# Patient Record
Sex: FEMALE | Race: WHITE | NOT HISPANIC OR LATINO | Employment: UNEMPLOYED | URBAN - METROPOLITAN AREA
[De-identification: names, ages, dates, MRNs, and addresses within clinical notes are randomized per-mention and may not be internally consistent; named-entity substitution may affect disease eponyms.]

---

## 2018-11-30 ENCOUNTER — HOSPITAL ENCOUNTER (INPATIENT)
Facility: HOSPITAL | Age: 53
LOS: 3 days | Discharge: HOME | DRG: 316 | End: 2018-12-03
Attending: STUDENT IN AN ORGANIZED HEALTH CARE EDUCATION/TRAINING PROGRAM | Admitting: FAMILY MEDICINE
Payer: MEDICARE

## 2018-11-30 ENCOUNTER — APPOINTMENT (EMERGENCY)
Dept: MRI IMAGING | Facility: HOSPITAL | Age: 53
End: 2018-11-30
Payer: MEDICARE

## 2018-11-30 ENCOUNTER — APPOINTMENT (EMERGENCY)
Dept: RADIOLOGY | Facility: HOSPITAL | Age: 53
End: 2018-11-30
Payer: MEDICARE

## 2018-11-30 ENCOUNTER — HOSPITAL ENCOUNTER (EMERGENCY)
Facility: HOSPITAL | Age: 53
End: 2018-11-30
Attending: EMERGENCY MEDICINE | Admitting: EMERGENCY MEDICINE
Payer: MEDICARE

## 2018-11-30 VITALS
HEART RATE: 72 BPM | OXYGEN SATURATION: 95 % | WEIGHT: 158.07 LBS | HEIGHT: 59 IN | TEMPERATURE: 99 F | RESPIRATION RATE: 18 BRPM | BODY MASS INDEX: 31.87 KG/M2 | SYSTOLIC BLOOD PRESSURE: 127 MMHG | DIASTOLIC BLOOD PRESSURE: 76 MMHG

## 2018-11-30 DIAGNOSIS — Z72.0 TOBACCO USE: ICD-10-CM

## 2018-11-30 DIAGNOSIS — M86.9 OSTEOMYELITIS OF FINGER OF RIGHT HAND (HCC): Primary | ICD-10-CM

## 2018-11-30 DIAGNOSIS — L02.519 HAND ABSCESS: ICD-10-CM

## 2018-11-30 DIAGNOSIS — L02.511 ABSCESS OF RIGHT INDEX FINGER: Primary | ICD-10-CM

## 2018-11-30 DIAGNOSIS — M65.9 TENOSYNOVITIS OF HAND: ICD-10-CM

## 2018-11-30 DIAGNOSIS — L02.511 ABSCESS OF RIGHT INDEX FINGER: ICD-10-CM

## 2018-11-30 DIAGNOSIS — M86.9 OSTEOMYELITIS (HCC): ICD-10-CM

## 2018-11-30 DIAGNOSIS — M86.9 FINGER OSTEOMYELITIS, RIGHT (HCC): ICD-10-CM

## 2018-11-30 LAB
ALBUMIN SERPL BCP-MCNC: 3.1 G/DL (ref 3.5–5)
ALP SERPL-CCNC: 95 U/L (ref 46–116)
ALT SERPL W P-5'-P-CCNC: 71 U/L (ref 12–78)
ANION GAP SERPL CALCULATED.3IONS-SCNC: 10 MMOL/L (ref 4–13)
APTT PPP: 28 SECONDS (ref 26–38)
AST SERPL W P-5'-P-CCNC: 27 U/L (ref 5–45)
BASOPHILS # BLD AUTO: 0.03 THOUSANDS/ΜL (ref 0–0.1)
BASOPHILS NFR BLD AUTO: 0 % (ref 0–1)
BILIRUB DIRECT SERPL-MCNC: 0.07 MG/DL (ref 0–0.2)
BILIRUB SERPL-MCNC: 0.2 MG/DL (ref 0.2–1)
BUN SERPL-MCNC: 16 MG/DL (ref 5–25)
CALCIUM SERPL-MCNC: 8.5 MG/DL (ref 8.3–10.1)
CHLORIDE SERPL-SCNC: 103 MMOL/L (ref 100–108)
CO2 SERPL-SCNC: 26 MMOL/L (ref 21–32)
CREAT SERPL-MCNC: 0.77 MG/DL (ref 0.6–1.3)
EOSINOPHIL # BLD AUTO: 0.25 THOUSAND/ΜL (ref 0–0.61)
EOSINOPHIL NFR BLD AUTO: 3 % (ref 0–6)
ERYTHROCYTE [DISTWIDTH] IN BLOOD BY AUTOMATED COUNT: 12.8 % (ref 11.6–15.1)
GFR SERPL CREATININE-BSD FRML MDRD: 89 ML/MIN/1.73SQ M
GLUCOSE SERPL-MCNC: 141 MG/DL (ref 65–140)
HCT VFR BLD AUTO: 38.6 % (ref 34.8–46.1)
HGB BLD-MCNC: 12.4 G/DL (ref 11.5–15.4)
IMM GRANULOCYTES # BLD AUTO: 0.12 THOUSAND/UL (ref 0–0.2)
IMM GRANULOCYTES NFR BLD AUTO: 1 % (ref 0–2)
INR PPP: 1.01 (ref 0.86–1.17)
LACTATE SERPL-SCNC: 0.9 MMOL/L (ref 0.5–2)
LYMPHOCYTES # BLD AUTO: 1.98 THOUSANDS/ΜL (ref 0.6–4.47)
LYMPHOCYTES NFR BLD AUTO: 22 % (ref 14–44)
MCH RBC QN AUTO: 30 PG (ref 26.8–34.3)
MCHC RBC AUTO-ENTMCNC: 32.1 G/DL (ref 31.4–37.4)
MCV RBC AUTO: 94 FL (ref 82–98)
MONOCYTES # BLD AUTO: 0.5 THOUSAND/ΜL (ref 0.17–1.22)
MONOCYTES NFR BLD AUTO: 6 % (ref 4–12)
NEUTROPHILS # BLD AUTO: 6.23 THOUSANDS/ΜL (ref 1.85–7.62)
NEUTS SEG NFR BLD AUTO: 68 % (ref 43–75)
NRBC BLD AUTO-RTO: 0 /100 WBCS
PLATELET # BLD AUTO: 341 THOUSANDS/UL (ref 149–390)
PMV BLD AUTO: 9.4 FL (ref 8.9–12.7)
POTASSIUM SERPL-SCNC: 3.7 MMOL/L (ref 3.5–5.3)
PROT SERPL-MCNC: 7.6 G/DL (ref 6.4–8.2)
PROTHROMBIN TIME: 13 SECONDS (ref 11.8–14.2)
RBC # BLD AUTO: 4.13 MILLION/UL (ref 3.81–5.12)
SODIUM SERPL-SCNC: 139 MMOL/L (ref 136–145)
WBC # BLD AUTO: 9.11 THOUSAND/UL (ref 4.31–10.16)

## 2018-11-30 PROCEDURE — 85730 THROMBOPLASTIN TIME PARTIAL: CPT | Performed by: EMERGENCY MEDICINE

## 2018-11-30 PROCEDURE — 87081 CULTURE SCREEN ONLY: CPT | Performed by: FAMILY MEDICINE

## 2018-11-30 PROCEDURE — 85025 COMPLETE CBC W/AUTO DIFF WBC: CPT | Performed by: EMERGENCY MEDICINE

## 2018-11-30 PROCEDURE — 85610 PROTHROMBIN TIME: CPT | Performed by: EMERGENCY MEDICINE

## 2018-11-30 PROCEDURE — 80076 HEPATIC FUNCTION PANEL: CPT | Performed by: EMERGENCY MEDICINE

## 2018-11-30 PROCEDURE — 83605 ASSAY OF LACTIC ACID: CPT | Performed by: EMERGENCY MEDICINE

## 2018-11-30 PROCEDURE — 73140 X-RAY EXAM OF FINGER(S): CPT

## 2018-11-30 PROCEDURE — 96366 THER/PROPH/DIAG IV INF ADDON: CPT

## 2018-11-30 PROCEDURE — 73220 MRI UPPR EXTREMITY W/O&W/DYE: CPT

## 2018-11-30 PROCEDURE — 96367 TX/PROPH/DG ADDL SEQ IV INF: CPT

## 2018-11-30 PROCEDURE — 87040 BLOOD CULTURE FOR BACTERIA: CPT | Performed by: EMERGENCY MEDICINE

## 2018-11-30 PROCEDURE — 36415 COLL VENOUS BLD VENIPUNCTURE: CPT | Performed by: EMERGENCY MEDICINE

## 2018-11-30 PROCEDURE — 99285 EMERGENCY DEPT VISIT HI MDM: CPT

## 2018-11-30 PROCEDURE — 80048 BASIC METABOLIC PNL TOTAL CA: CPT | Performed by: EMERGENCY MEDICINE

## 2018-11-30 PROCEDURE — 96365 THER/PROPH/DIAG IV INF INIT: CPT

## 2018-11-30 PROCEDURE — 99223 1ST HOSP IP/OBS HIGH 75: CPT | Performed by: FAMILY MEDICINE

## 2018-11-30 PROCEDURE — A9585 GADOBUTROL INJECTION: HCPCS | Performed by: EMERGENCY MEDICINE

## 2018-11-30 RX ORDER — VANCOMYCIN HYDROCHLORIDE 1 G/200ML
15 INJECTION, SOLUTION INTRAVENOUS ONCE
Status: COMPLETED | OUTPATIENT
Start: 2018-11-30 | End: 2018-11-30

## 2018-11-30 RX ORDER — VENLAFAXINE 75 MG/1
75 TABLET ORAL DAILY
COMMUNITY
End: 2018-12-14 | Stop reason: DRUGHIGH

## 2018-11-30 RX ORDER — OXYCODONE HYDROCHLORIDE 5 MG/1
5 TABLET ORAL EVERY 6 HOURS PRN
Status: DISCONTINUED | OUTPATIENT
Start: 2018-11-30 | End: 2018-12-03 | Stop reason: HOSPADM

## 2018-11-30 RX ORDER — BUSPIRONE HYDROCHLORIDE 10 MG/1
10 TABLET ORAL 3 TIMES DAILY PRN
COMMUNITY
End: 2020-04-01

## 2018-11-30 RX ORDER — NICOTINE 21 MG/24HR
1 PATCH, TRANSDERMAL 24 HOURS TRANSDERMAL DAILY
Status: DISCONTINUED | OUTPATIENT
Start: 2018-12-01 | End: 2018-12-03 | Stop reason: HOSPADM

## 2018-11-30 RX ORDER — LISINOPRIL 20 MG/1
20 TABLET ORAL DAILY
COMMUNITY
End: 2020-07-01 | Stop reason: SDUPTHER

## 2018-11-30 RX ORDER — ONDANSETRON 2 MG/ML
4 INJECTION INTRAMUSCULAR; INTRAVENOUS EVERY 6 HOURS PRN
Status: DISCONTINUED | OUTPATIENT
Start: 2018-11-30 | End: 2018-12-03 | Stop reason: HOSPADM

## 2018-11-30 RX ORDER — ATORVASTATIN CALCIUM 40 MG/1
40 TABLET, FILM COATED ORAL DAILY
COMMUNITY
End: 2020-06-24

## 2018-11-30 RX ORDER — VENLAFAXINE 100 MG/1
100 TABLET ORAL DAILY
COMMUNITY
End: 2018-12-14 | Stop reason: DRUGHIGH

## 2018-11-30 RX ORDER — AMLODIPINE BESYLATE 5 MG/1
5 TABLET ORAL DAILY
Status: DISCONTINUED | OUTPATIENT
Start: 2018-12-01 | End: 2018-12-03 | Stop reason: HOSPADM

## 2018-11-30 RX ORDER — LISINOPRIL 20 MG/1
20 TABLET ORAL DAILY
Status: DISCONTINUED | OUTPATIENT
Start: 2018-12-01 | End: 2018-12-03 | Stop reason: HOSPADM

## 2018-11-30 RX ORDER — OXYCODONE HYDROCHLORIDE 5 MG/1
5 TABLET ORAL EVERY 6 HOURS PRN
COMMUNITY
End: 2018-12-03

## 2018-11-30 RX ORDER — VENLAFAXINE 37.5 MG/1
75 TABLET ORAL DAILY
Status: DISCONTINUED | OUTPATIENT
Start: 2018-12-01 | End: 2018-12-03 | Stop reason: HOSPADM

## 2018-11-30 RX ORDER — AMLODIPINE BESYLATE 5 MG/1
5 TABLET ORAL DAILY
COMMUNITY
End: 2020-06-24

## 2018-11-30 RX ORDER — ATORVASTATIN CALCIUM 40 MG/1
40 TABLET, FILM COATED ORAL DAILY
Status: DISCONTINUED | OUTPATIENT
Start: 2018-12-01 | End: 2018-12-03 | Stop reason: HOSPADM

## 2018-11-30 RX ADMIN — VANCOMYCIN HYDROCHLORIDE 250 MG: 500 INJECTION, POWDER, LYOPHILIZED, FOR SOLUTION INTRAVENOUS at 17:54

## 2018-11-30 RX ADMIN — OXYCODONE HYDROCHLORIDE 5 MG: 5 TABLET ORAL at 23:00

## 2018-11-30 RX ADMIN — PIPERACILLIN SODIUM,TAZOBACTAM SODIUM 3.38 G: 3; .375 INJECTION, POWDER, FOR SOLUTION INTRAVENOUS at 14:09

## 2018-11-30 RX ADMIN — VANCOMYCIN HYDROCHLORIDE 1000 MG: 750 INJECTION, SOLUTION INTRAVENOUS at 15:03

## 2018-11-30 RX ADMIN — GADOBUTROL 7 ML: 604.72 INJECTION INTRAVENOUS at 14:01

## 2018-11-30 NOTE — ED ATTENDING ATTESTATION
I, Odalis Guerra DO, saw and evaluated the patient  I have discussed the patient with the resident/non-physician practitioner and agree with the resident's/non-physician practitioner's findings, Plan of Care, and MDM as documented in the resident's/non-physician practitioner's note, except where noted  All available labs and Radiology studies were reviewed  At this point I agree with the current assessment done in the Emergency Department  I have conducted an independent evaluation of this patient a history and physical is as follows:      Critical Care Time  The patient presented with a condition in which there was a high probability of imminent or life-threatening deterioration, and critical care services (excluding separately billable procedures) totalled 30-74 minutes  Procedures       15-year-old female, cut her finger with a can to half weeks ago, developed an infection was admitted to [facility] for 5 days IV antibiotics discharged home Keflex erythema the hand and arm has significantly decreased but over the past 3 days patient's finger has tripled in size become painful , describes pain as dull constant nonradiating worse with palpation better alone 5/10  Still taking Keflex  No fevers chills nausea vomiting diarrhea dysuria chest pain shortness of breath,  All other review of systems negative  On examination, patient appears uncomfortable, heart regular rate rhythm lungs clear, abdomen nontender, right hand, 2nd digit, diffusely swollen, dusky in appearance, desquamation of tissue of 50%  , dry gangrenous appearance to flexor aspect over DI P S  Patient's finger in a mallet finger deformity  This is new  After multiple discussions between Hand surgery patient was kept here at [facility] to obtain an MRI of the finger, the which shows abscess and early osteomyelitis  , as per Hand surgery requesting patient be transferred to [place] [address] so he can operate on her today    Case was discussed with internal medicine over there for admission

## 2018-11-30 NOTE — ED NOTES
Pt  Returned from [facility], RN  11/30/18 6884 Jarret Hagen, [facility]  11/30/18 9849

## 2018-11-30 NOTE — ED PROVIDER NOTES
History  Chief Complaint   Patient presents with    Finger Injury     patient cut right hand second finger on food can two weeks ago  Received IV antibiotics and was sent home with Keflex  no improvement noted  Patient is a 50-year-old female that presents for worsening right index finger infection  Patient states that she cut her finger on a can 2 weeks ago and was seen at [facility]   Patient was admitted and received 5 days of IV antibiotics  During her admission multiple blisters were incised but no surgical incision was created  Patient was discharged with p o  Keflex which she has been taking, along with a follow-up with Infectious Disease on December 12  Patient states that the redness has decreased from mid forearm down to just her finger  [place] [facility] However her index finger has tripled in size and now her finger tip he has been turned down  Patient has moderate tenderness to palpation along the finger  Patient denies fever, chills, shortness of breath, pain in palm, wrist or forearm  Prior to Admission Medications   Prescriptions Last Dose Informant Patient Reported? Taking? amLODIPine (NORVASC) 5 mg tablet  Self Yes Yes   Sig: Take 5 mg by mouth daily   aspirin (ECOTRIN) 325 mg EC tablet  Self Yes Yes   Sig: Take 325 mg by mouth daily   atorvastatin (LIPITOR) 40 mg tablet  Self Yes Yes   Sig: Take 40 mg by mouth daily   busPIRone (BUSPAR) 10 mg tablet  Self Yes Yes   Sig: Take 10 mg by mouth 3 (three) times a day   lisinopril (ZESTRIL) 20 mg tablet  Self Yes Yes   Sig: Take 20 mg by mouth daily   venlafaxine (EFFEXOR) 75 mg tablet  Self Yes Yes   Sig: Take 75 mg by mouth 2 (two) times a day      Facility-Administered Medications: None       Past Medical History:   Diagnosis Date    Hypertension     Stroke [facility])        Past Surgical History:   Procedure Laterality Date    CHOLECYSTECTOMY         History reviewed  No pertinent family history    I have reviewed and agree with the history as documented  Social History   Substance Use Topics    Smoking status: Current Some Day Smoker    Smokeless tobacco: Never Used    Alcohol use Yes      Comment: occasional        Review of Systems   Constitutional: Negative for activity change, chills, diaphoresis, fatigue and fever  HENT: Negative for congestion, sinus pressure and sore throat  Eyes: Negative for pain and visual disturbance  Respiratory: Negative for cough, chest tightness, shortness of breath, wheezing and stridor  Cardiovascular: Negative for chest pain, palpitations and leg swelling  Gastrointestinal: Negative for abdominal distention, abdominal pain, constipation, diarrhea, nausea and vomiting  Genitourinary: Negative for dysuria and frequency  Musculoskeletal: Negative for neck pain and neck stiffness  Right finger swelling and pain   Skin: Positive for color change and pallor  Negative for rash  Allergic/Immunologic: Negative for immunocompromised state  Neurological: Negative for dizziness, speech difficulty, weakness, light-headedness, numbness and headaches  Psychiatric/Behavioral: Negative for agitation, behavioral problems and confusion  Physical Exam  Physical Exam   Constitutional: She is oriented to person, place, and time  She appears well-developed and well-nourished  HENT:   Head: Normocephalic and atraumatic  Nose: Nose normal    Eyes: Pupils are equal, round, and reactive to light  EOM are normal    Neck: Normal range of motion  Neck supple  Cardiovascular: Normal rate, regular rhythm and normal heart sounds  Pulmonary/Chest: Effort normal and breath sounds normal    Abdominal: Soft  Bowel sounds are normal    Musculoskeletal: She exhibits edema, tenderness and deformity  Right index finger edematous, erythematous and gray with a dusky appearance  Two sections of necrotic tissue on san side of finger  No redness or streaking along tendon to palm or forearm  Neurological: She is alert and oriented to person, place, and time  Skin: Skin is warm and dry  Psychiatric: She has a normal mood and affect  Judgment and thought content normal        Vital Signs  ED Triage Vitals [11/30/18 1112]   Temperature Pulse Respirations Blood Pressure SpO2   98 1 °F (36 7 °C) 63 18 151/86 100 %      Temp Source Heart Rate Source Patient Position - Orthostatic VS BP Location FiO2 (%)   Oral Monitor Lying Left arm --      Pain Score       3           Vitals:    11/30/18 1436 11/30/18 1549 11/30/18 1820 11/30/18 1830   BP: 114/70 (!) 178/85 138/79 138/79   Pulse: 66 60 69    Patient Position - Orthostatic VS: Lying Lying Lying        Visual Acuity      ED Medications  Medications   piperacillin-tazobactam (ZOSYN) 3 375 g in sodium chloride 0 9 % 50 mL IVPB (0 g Intravenous Stopped 11/30/18 1439)   vancomycin (VANCOCIN) IVPB (premix) 1,000 mg (0 mg/kg × 73 kg Intravenous Stopped 11/30/18 1712)   gadobutrol injection (MULTI-DOSE) SOLN 7 mL (7 mL Intravenous Given 11/30/18 1401)   vancomycin (VANCOCIN) 250 mg in sodium chloride 0 9 % 100 mL IVPB (0 mg Intravenous Stopped 11/30/18 1824)       Diagnostic Studies  Results Reviewed     Procedure Component Value Units Date/Time    POCT pregnancy, urine [[id number]]     Lab Status:  No result     Lactic acid, plasma [[id number]]  (Normal) Collected:  11/30/18 1204    Lab Status:  Final result Specimen:  Blood from Arm, Right Updated:  11/30/18 1233     LACTIC ACID 0 9 mmol/L     Narrative:         Result may be elevated if tourniquet was used during collection      Basic metabolic panel [[id number]]  (Abnormal) Collected:  11/30/18 1204    Lab Status:  Final result Specimen:  Blood from Arm, Right Updated:  11/30/18 1230     Sodium 139 mmol/L      Potassium 3 7 mmol/L      Chloride 103 mmol/L      CO2 26 mmol/L      ANION GAP 10 mmol/L      BUN 16 mg/dL      Creatinine 0 77 mg/dL      Glucose 141 (H) mg/dL      Calcium 8 5 mg/dL      eGFR 89 ml/min/1 73sq m     Narrative:         National Kidney Disease Education Program recommendations are as follows:  GFR calculation is accurate only with a steady state creatinine  Chronic Kidney disease less than 60 ml/min/1 73 sq  meters  Kidney failure less than 15 ml/min/1 73 sq  meters  Hepatic function panel [[id number]]  (Abnormal) Collected:  11/30/18 1204    Lab Status:  Final result Specimen:  Blood from Arm, Right Updated:  11/30/18 1230     Total Bilirubin 0 20 mg/dL      Bilirubin, Direct 0 07 mg/dL      Alkaline Phosphatase 95 U/L      AST 27 U/L      ALT 71 U/L      Total Protein 7 6 g/dL      Albumin 3 1 (L) g/dL     Protime-INR [[id number]]  (Normal) Collected:  11/30/18 1204    Lab Status:  Final result Specimen:  Blood from Arm, Right Updated:  11/30/18 1225     Protime 13 0 seconds      INR 1 01    APTT [[id number]]  (Normal) Collected:  11/30/18 1204    Lab Status:  Final result Specimen:  Blood from Arm, Right Updated:  11/30/18 1225     PTT 28 seconds     Blood culture #1 [[id number]] Collected:  11/30/18 1217    Lab Status:   In process Specimen:  Blood from Arm, Left Updated:  11/30/18 1223    CBC and differential [[id number]] Collected:  11/30/18 1204    Lab Status:  Final result Specimen:  Blood from Arm, Right Updated:  11/30/18 1213     WBC 9 11 Thousand/uL      RBC 4 13 Million/uL      Hemoglobin 12 4 g/dL      Hematocrit 38 6 %      MCV 94 fL      MCH 30 0 pg      MCHC 32 1 g/dL      RDW 12 8 %      MPV 9 4 fL      Platelets 182 Thousands/uL      nRBC 0 /100 WBCs      Neutrophils Relative 68 %      Immat GRANS % 1 %      Lymphocytes Relative 22 %      Monocytes Relative 6 %      Eosinophils Relative 3 %      Basophils Relative 0 %      Neutrophils Absolute 6 23 Thousands/µL      Immature Grans Absolute 0 12 Thousand/uL      Lymphocytes Absolute 1 98 Thousands/µL      Monocytes Absolute 0 50 Thousand/µL      Eosinophils Absolute 0 25 Thousand/µL      Basophils Absolute 0 03 Thousands/µL Blood culture #2 [[id number]] Collected:  11/30/18 1204    Lab Status: In process Specimen:  Blood from Arm, Right Updated:  11/30/18 1212                 MRI hand right w wo contrast   Final Result by Dariel Heimlich, MD (11/30 4203)      1  Findings consistent with  abscess  palmar to the 2nd proximal interphalangeal joint, measuring up to 7 mm  Additionally, there are foci of hypoperfusion extending from the injury site to the middle aspect of the proximal phalanx which are consistent    with developing abscesses  Please note, necrotizing fasciitis is difficult to diagnose the imaging findings alone, and must be weighed with the clinical findings  2   Bone marrow edema within the 2nd middle phalanx concerning for early osteomyelitis   3  Tenosynovitis of the 2nd digit flexors, likely infectious  The study was marked in [facility] for immediate notification  Workstation performed: [id number]         XR finger second digit-index RIGHT   Final Result by Alcon Ulloa DO (11/30 9078)   1  Diffuse soft tissue swelling surrounding the entire right 2nd digit  Lucency in the soft tissues at the distal interphalangeal joint, suspicious for soft tissue ulceration  Lucent foci in the ventral soft tissues overlying the middle phalanx,    suspicious for subcutaneous emphysema  Findings consistent with diffuse cellulitis  Correlate for necrotizing fasciitis  2   Mild flexion at the distal interphalangeal joint  Correlate for flexor tendon injury  3   No evidence of acute fracture or radiopaque foreign body               I personally discussed this study with Giana Magaña on 11/30/2018 at 12:49 PM                Workstation performed: [id number]                    Procedures  Procedures       Phone Contacts  ED Phone Contact    ED Course                               MDM  Number of Diagnoses or Management Options  Abscess of right index finger: new and requires workup  Finger osteomyelitis, right ([facility] [address] ): new and requires workup  Hand abscess: new and requires workup  Osteomyelitis [facility]): new and requires workup  Tenosynovitis of hand: new and requires workup     Amount and/or Complexity of Data Reviewed  Clinical lab tests: ordered and reviewed  Tests in the radiology section of CPT®: ordered and reviewed  Tests in the medicine section of CPT®: ordered and reviewed  Discussion of test results with the performing providers: yes  Decide to obtain previous medical records or to obtain history from someone other than the patient: yes  Obtain history from someone other than the patient: yes  Review and summarize past medical records: yes  Discuss the patient with other providers: yes  Independent visualization of images, tracings, or specimens: yes    Risk of Complications, Morbidity, and/or Mortality  Presenting problems: high  Diagnostic procedures: high  Management options: high    Patient Progress  Patient progress: stable    CritCare Time    Disposition  Final diagnoses:   Osteomyelitis ([facility] [address] )   Hand abscess   Tenosynovitis of hand   Abscess of right index finger   Finger osteomyelitis, right ([facility] )     Time reflects when diagnosis was documented in both MDM as applicable and the Disposition within this note     Time User Action Codes Description Comment    11/30/2018  3:33 PM Clemente [place] Add [M86 9] Osteomyelitis ([address] )     11/30/2018  3:33 PM Clemente Rubén Add [L02 519] Hand abscess     11/30/2018  3:33 PM Britney SCHILLING Add [M65 9] Tenosynovitis of hand     11/30/2018  8:17 PM Belkis [place] Add [L02 511] Abscess of right index finger     11/30/2018  8:18 PM Thyra Olvin [M86 9] Osteomyelitis ([facility] [address] )     11/30/2018  8:18 PM Dieudonne Ny Modify [L02 511] Abscess of right index finger     11/30/2018  8:18 PM Dieudonne Ny Add [M86 9] Finger osteomyelitis, right ([facility] [address] )     11/30/2018  8:18 PM Dieudonne Ny Modify [L02 511] Abscess of right index finger     11/30/2018  8:18 PM Shoshana De Leon Rudolph [place] Modify [M86 9] Finger osteomyelitis, right ([address] )     11/30/2018  8:18 PM Sharyle Beverage Modify [L02 511] Abscess of right index finger     11/30/2018  8:18 PM Sharyle Beverage Modify [M86 9] Finger osteomyelitis, right [facility])       ED Disposition     ED Disposition Condition Comment    Transfer to Another [address] [place] [address] should be transferred out to [address] [place] [address], General Medicine Service under Dr Ed Allen MD Documentation      Most Recent Value   Patient Condition  The patient has been stabilized such that within reasonable medical probability, no material deterioration of the patient condition or the condition of the unborn child(chidi) is likely to result from the transfer   Reason for Transfer  Level of Care needed not available at this facility   Benefits of Transfer  Specialized equipment and/or services available at the receiving facility (Include comment)________________________   Risks of Transfer  Potential deterioration of medical condition   Accepting Physician  Dr Cindy Herrera Name, [facility]   Sending MD Dr Darrin Cottrell   Provider Certification  General risk, such as traffic hazards, adverse weather conditions, rough terrain or turbulence, possible failure of equipment (including vehicle or aircraft), or consequences of actions of persons outside the control of the transport personnel, Risk of worsening condition      RN Documentation      Most Recent Value   Accepting Facility Name, Donnell Cannon      Follow-up Information    None         Patient's Medications   Discharge Prescriptions    No medications on file     No discharge procedures on file      ED Provider  Electronically Signed by           Jaquelin Wilson PA-C  11/30/18 0575 [facility],   11/30/18 2018

## 2018-11-30 NOTE — EMTALA/ACUTE CARE TRANSFER
[id number]  [id number]  Dept: [phone]      EMTALA TRANSFER CONSENT    NAME Ash Mak                                         1965                              MRN [id number]    I have been informed of my rights regarding examination, treatment, and transfer   by Dr Michael Olivier DO    Benefits: Specialized equipment and/or services available at the receiving facility (Include comment)________________________ (Hand specialist)    Risks: Potential deterioration of medical condition      Consent for Transfer:  I acknowledge that my medical condition has been evaluated and explained to me by the emergency department physician or other qualified medical person and/or my attending physician, who has recommended that I be transferred to the service of  Accepting Physician: Dr Aiyana Harris at [address] [facility] Name, [address] : [place]  The above potential benefits of such transfer, the potential risks associated with such transfer, and the probable risks of not being transferred have been explained to me, and I fully understand them  The doctor has explained that, in my case, the benefits of transfer outweigh the risks  I agree to be transferred  I authorize the performance of emergency medical procedures and treatments upon me in both transit and upon arrival at the receiving facility  Additionally, I authorize the release of any and all medical records to the receiving facility and request they be transported with me, if possible  I understand that the safest mode of transportation during a medical emergency is an ambulance and that the Hospital advocates the use of this mode of transport  Risks of traveling to the receiving facility by car, including absence of medical control, life sustaining equipment, such as oxygen, and medical personnel has been explained to me and I fully understand them      ([address])  [  ]  I consent to the stated transfer and to be transported by ambulance/helicopter  [  ]  I consent to the stated transfer, but refuse transportation by ambulance and accept full responsibility for my transportation by car  I understand the risks of non-ambulance transfers and I exonerate the Hospital and its staff from any deterioration in my condition that results from this refusal     X___________________________________________    DATE  18  TIME________  Signature of patient or legally responsible individual signing on patient behalf           RELATIONSHIP TO PATIENT_________________________          Provider Certification    NAME Chad Travis                                        [place] 1965                              MRN [id number]    A medical screening exam was performed on the above named patient  Based on the examination:    Condition Necessitating Transfer The primary encounter diagnosis was Osteomyelitis ([address] )  Diagnoses of Hand abscess and Tenosynovitis of hand were also pertinent to this visit      Patient Condition: The patient has been stabilized such that within reasonable medical probability, no material deterioration of the patient condition or the condition of the unborn child(chidi) is likely to result from the transfer    Reason for Transfer: Level of Care needed not available at this facility    Transfer Requirements: [address]   · Space available and qualified personnel available for treatment as acknowledged by    · Agreed to accept transfer and to provide appropriate medical treatment as acknowledged by       Dr Akil Lopez  · Appropriate medical records of the examination and treatment of the patient are provided at the time of transfer   [address], [address] _______  · Transfer will be performed by qualified personnel from    and appropriate transfer equipment as required, including the use of necessary and appropriate life support measures  Provider Certification: I have examined the patient and explained the following risks and benefits of being transferred/refusing transfer to the patient/family:  General risk, such as traffic hazards, adverse weather conditions, rough terrain or turbulence, possible failure of equipment (including vehicle or aircraft), or consequences of actions of persons outside the control of the transport personnel, Risk of worsening condition      Based on these reasonable risks and benefits to the patient and/or the unborn child(chidi), and based upon the information available at the time of the patients examination, I certify that the medical benefits reasonably to be expected from the provision of appropriate medical treatments at another medical facility outweigh the increasing risks, if any, to the individuals medical condition, and in the case of labor to the unborn child, from effecting the transfer      X____________________________________________ DATE 11/30/18        TIME_______      ORIGINAL - SEND TO MEDICAL RECORDS   COPY - SEND WITH PATIENT DURING TRANSFER

## 2018-12-01 ENCOUNTER — ANESTHESIA EVENT (INPATIENT)
Dept: PERIOP | Facility: HOSPITAL | Age: 53
DRG: 316 | End: 2018-12-01
Payer: MEDICARE

## 2018-12-01 ENCOUNTER — APPOINTMENT (INPATIENT)
Dept: RADIOLOGY | Facility: HOSPITAL | Age: 53
DRG: 316 | End: 2018-12-01
Payer: MEDICARE

## 2018-12-01 ENCOUNTER — ANESTHESIA (INPATIENT)
Dept: PERIOP | Facility: HOSPITAL | Age: 53
DRG: 316 | End: 2018-12-01
Payer: MEDICARE

## 2018-12-01 PROBLEM — Z72.0 TOBACCO USE: Status: ACTIVE | Noted: 2018-12-01

## 2018-12-01 PROBLEM — F41.9 ANXIETY AND DEPRESSION: Status: ACTIVE | Noted: 2018-12-01

## 2018-12-01 PROBLEM — F32.A ANXIETY AND DEPRESSION: Status: ACTIVE | Noted: 2018-12-01

## 2018-12-01 PROCEDURE — 26910 AMPUTATE METACARPAL BONE: CPT | Performed by: ORTHOPAEDIC SURGERY

## 2018-12-01 PROCEDURE — 88300 SURGICAL PATH GROSS: CPT | Performed by: PATHOLOGY

## 2018-12-01 PROCEDURE — 87147 CULTURE TYPE IMMUNOLOGIC: CPT | Performed by: ORTHOPAEDIC SURGERY

## 2018-12-01 PROCEDURE — 73140 X-RAY EXAM OF FINGER(S): CPT

## 2018-12-01 PROCEDURE — 0X6N0Z0 DETACHMENT AT RIGHT INDEX FINGER, COMPLETE, OPEN APPROACH: ICD-10-PCS | Performed by: ORTHOPAEDIC SURGERY

## 2018-12-01 PROCEDURE — 87205 SMEAR GRAM STAIN: CPT | Performed by: ORTHOPAEDIC SURGERY

## 2018-12-01 PROCEDURE — 93005 ELECTROCARDIOGRAM TRACING: CPT

## 2018-12-01 PROCEDURE — 87070 CULTURE OTHR SPECIMN AEROBIC: CPT | Performed by: ORTHOPAEDIC SURGERY

## 2018-12-01 PROCEDURE — 99232 SBSQ HOSP IP/OBS MODERATE 35: CPT | Performed by: STUDENT IN AN ORGANIZED HEALTH CARE EDUCATION/TRAINING PROGRAM

## 2018-12-01 PROCEDURE — 99252 IP/OBS CONSLTJ NEW/EST SF 35: CPT | Performed by: ORTHOPAEDIC SURGERY

## 2018-12-01 PROCEDURE — 87075 CULTR BACTERIA EXCEPT BLOOD: CPT | Performed by: ORTHOPAEDIC SURGERY

## 2018-12-01 RX ORDER — PROPOFOL 10 MG/ML
INJECTION, EMULSION INTRAVENOUS CONTINUOUS PRN
Status: DISCONTINUED | OUTPATIENT
Start: 2018-12-01 | End: 2018-12-01 | Stop reason: SURG

## 2018-12-01 RX ORDER — BUSPIRONE HYDROCHLORIDE 10 MG/1
10 TABLET ORAL 3 TIMES DAILY PRN
Status: DISCONTINUED | OUTPATIENT
Start: 2018-12-01 | End: 2018-12-03 | Stop reason: HOSPADM

## 2018-12-01 RX ORDER — MIDAZOLAM HYDROCHLORIDE 1 MG/ML
INJECTION INTRAMUSCULAR; INTRAVENOUS AS NEEDED
Status: DISCONTINUED | OUTPATIENT
Start: 2018-12-01 | End: 2018-12-01 | Stop reason: SURG

## 2018-12-01 RX ORDER — SODIUM CHLORIDE, SODIUM LACTATE, POTASSIUM CHLORIDE, CALCIUM CHLORIDE 600; 310; 30; 20 MG/100ML; MG/100ML; MG/100ML; MG/100ML
100 INJECTION, SOLUTION INTRAVENOUS CONTINUOUS
Status: DISCONTINUED | OUTPATIENT
Start: 2018-12-01 | End: 2018-12-01

## 2018-12-01 RX ORDER — HYDROMORPHONE HCL/PF 1 MG/ML
0.5 SYRINGE (ML) INJECTION
Status: DISCONTINUED | OUTPATIENT
Start: 2018-12-01 | End: 2018-12-01 | Stop reason: HOSPADM

## 2018-12-01 RX ORDER — SODIUM CHLORIDE, SODIUM LACTATE, POTASSIUM CHLORIDE, CALCIUM CHLORIDE 600; 310; 30; 20 MG/100ML; MG/100ML; MG/100ML; MG/100ML
75 INJECTION, SOLUTION INTRAVENOUS CONTINUOUS
Status: DISCONTINUED | OUTPATIENT
Start: 2018-12-01 | End: 2018-12-01

## 2018-12-01 RX ORDER — HYDRALAZINE HYDROCHLORIDE 20 MG/ML
10 INJECTION INTRAMUSCULAR; INTRAVENOUS ONCE
Status: COMPLETED | OUTPATIENT
Start: 2018-12-01 | End: 2018-12-01

## 2018-12-01 RX ORDER — DEXAMETHASONE SODIUM PHOSPHATE 4 MG/ML
INJECTION, SOLUTION INTRA-ARTICULAR; INTRALESIONAL; INTRAMUSCULAR; INTRAVENOUS; SOFT TISSUE AS NEEDED
Status: DISCONTINUED | OUTPATIENT
Start: 2018-12-01 | End: 2018-12-01 | Stop reason: SURG

## 2018-12-01 RX ORDER — FENTANYL CITRATE 50 UG/ML
INJECTION, SOLUTION INTRAMUSCULAR; INTRAVENOUS AS NEEDED
Status: DISCONTINUED | OUTPATIENT
Start: 2018-12-01 | End: 2018-12-01 | Stop reason: SURG

## 2018-12-01 RX ORDER — ONDANSETRON 2 MG/ML
4 INJECTION INTRAMUSCULAR; INTRAVENOUS ONCE AS NEEDED
Status: DISCONTINUED | OUTPATIENT
Start: 2018-12-01 | End: 2018-12-01 | Stop reason: HOSPADM

## 2018-12-01 RX ORDER — HYDROMORPHONE HYDROCHLORIDE 2 MG/ML
INJECTION, SOLUTION INTRAMUSCULAR; INTRAVENOUS; SUBCUTANEOUS AS NEEDED
Status: DISCONTINUED | OUTPATIENT
Start: 2018-12-01 | End: 2018-12-01 | Stop reason: SURG

## 2018-12-01 RX ORDER — CEFAZOLIN SODIUM 1 G/50ML
1000 SOLUTION INTRAVENOUS ONCE
Status: DISCONTINUED | OUTPATIENT
Start: 2018-12-01 | End: 2018-12-01

## 2018-12-01 RX ORDER — CEFAZOLIN SODIUM 1 G/3ML
INJECTION, POWDER, FOR SOLUTION INTRAMUSCULAR; INTRAVENOUS AS NEEDED
Status: DISCONTINUED | OUTPATIENT
Start: 2018-12-01 | End: 2018-12-01 | Stop reason: SURG

## 2018-12-01 RX ORDER — SODIUM CHLORIDE, SODIUM LACTATE, POTASSIUM CHLORIDE, CALCIUM CHLORIDE 600; 310; 30; 20 MG/100ML; MG/100ML; MG/100ML; MG/100ML
INJECTION, SOLUTION INTRAVENOUS CONTINUOUS PRN
Status: DISCONTINUED | OUTPATIENT
Start: 2018-12-01 | End: 2018-12-01

## 2018-12-01 RX ORDER — ONDANSETRON 2 MG/ML
INJECTION INTRAMUSCULAR; INTRAVENOUS AS NEEDED
Status: DISCONTINUED | OUTPATIENT
Start: 2018-12-01 | End: 2018-12-01 | Stop reason: SURG

## 2018-12-01 RX ORDER — VANCOMYCIN HYDROCHLORIDE 1 G/200ML
15 INJECTION, SOLUTION INTRAVENOUS EVERY 12 HOURS
Status: DISCONTINUED | OUTPATIENT
Start: 2018-12-01 | End: 2018-12-03

## 2018-12-01 RX ORDER — KETAMINE HYDROCHLORIDE 50 MG/ML
INJECTION, SOLUTION, CONCENTRATE INTRAMUSCULAR; INTRAVENOUS AS NEEDED
Status: DISCONTINUED | OUTPATIENT
Start: 2018-12-01 | End: 2018-12-01 | Stop reason: SURG

## 2018-12-01 RX ORDER — DIPHENHYDRAMINE HYDROCHLORIDE 50 MG/ML
12.5 INJECTION INTRAMUSCULAR; INTRAVENOUS ONCE AS NEEDED
Status: DISCONTINUED | OUTPATIENT
Start: 2018-12-01 | End: 2018-12-01 | Stop reason: HOSPADM

## 2018-12-01 RX ADMIN — SODIUM CHLORIDE, SODIUM LACTATE, POTASSIUM CHLORIDE, AND CALCIUM CHLORIDE: .6; .31; .03; .02 INJECTION, SOLUTION INTRAVENOUS at 08:17

## 2018-12-01 RX ADMIN — PROPOFOL 150 MCG/KG/MIN: 10 INJECTION, EMULSION INTRAVENOUS at 08:24

## 2018-12-01 RX ADMIN — VENLAFAXINE 100 MG: 37.5 TABLET ORAL at 12:35

## 2018-12-01 RX ADMIN — ONDANSETRON 4 MG: 2 INJECTION INTRAMUSCULAR; INTRAVENOUS at 12:36

## 2018-12-01 RX ADMIN — VANCOMYCIN HYDROCHLORIDE 1000 MG: 1 INJECTION, SOLUTION INTRAVENOUS at 03:24

## 2018-12-01 RX ADMIN — HYDROMORPHONE HYDROCHLORIDE 0.2 MG: 2 INJECTION, SOLUTION INTRAMUSCULAR; INTRAVENOUS; SUBCUTANEOUS at 09:52

## 2018-12-01 RX ADMIN — VANCOMYCIN HYDROCHLORIDE 1000 MG: 1 INJECTION, SOLUTION INTRAVENOUS at 15:56

## 2018-12-01 RX ADMIN — HYDROMORPHONE HYDROCHLORIDE 0.5 MG: 1 INJECTION, SOLUTION INTRAMUSCULAR; INTRAVENOUS; SUBCUTANEOUS at 11:38

## 2018-12-01 RX ADMIN — DEXAMETHASONE SODIUM PHOSPHATE 4 MG: 4 INJECTION, SOLUTION INTRA-ARTICULAR; INTRALESIONAL; INTRAMUSCULAR; INTRAVENOUS; SOFT TISSUE at 08:46

## 2018-12-01 RX ADMIN — HYDROMORPHONE HYDROCHLORIDE 0.2 MG: 2 INJECTION, SOLUTION INTRAMUSCULAR; INTRAVENOUS; SUBCUTANEOUS at 10:40

## 2018-12-01 RX ADMIN — KETAMINE HYDROCHLORIDE 50 MG: 50 INJECTION INTRAMUSCULAR; INTRAVENOUS at 08:24

## 2018-12-01 RX ADMIN — MORPHINE SULFATE 2 MG: 2 INJECTION, SOLUTION INTRAMUSCULAR; INTRAVENOUS at 12:36

## 2018-12-01 RX ADMIN — FENTANYL CITRATE 25 MCG: 50 INJECTION, SOLUTION INTRAMUSCULAR; INTRAVENOUS at 09:03

## 2018-12-01 RX ADMIN — MIDAZOLAM HYDROCHLORIDE 2 MG: 1 INJECTION, SOLUTION INTRAMUSCULAR; INTRAVENOUS at 08:18

## 2018-12-01 RX ADMIN — MORPHINE SULFATE 2 MG: 2 INJECTION, SOLUTION INTRAMUSCULAR; INTRAVENOUS at 05:38

## 2018-12-01 RX ADMIN — HYDROMORPHONE HYDROCHLORIDE 0.5 MG: 1 INJECTION, SOLUTION INTRAMUSCULAR; INTRAVENOUS; SUBCUTANEOUS at 11:23

## 2018-12-01 RX ADMIN — MORPHINE SULFATE 2 MG: 2 INJECTION, SOLUTION INTRAMUSCULAR; INTRAVENOUS at 20:06

## 2018-12-01 RX ADMIN — ATORVASTATIN CALCIUM 40 MG: 40 TABLET, FILM COATED ORAL at 12:33

## 2018-12-01 RX ADMIN — LISINOPRIL 20 MG: 20 TABLET ORAL at 12:32

## 2018-12-01 RX ADMIN — Medication 2000 MG: at 01:24

## 2018-12-01 RX ADMIN — OXYCODONE HYDROCHLORIDE 5 MG: 5 TABLET ORAL at 15:56

## 2018-12-01 RX ADMIN — FENTANYL CITRATE 50 MCG: 50 INJECTION, SOLUTION INTRAMUSCULAR; INTRAVENOUS at 08:39

## 2018-12-01 RX ADMIN — CEFAZOLIN 1000 MG: 1 INJECTION, POWDER, FOR SOLUTION INTRAVENOUS at 09:50

## 2018-12-01 RX ADMIN — HYDROMORPHONE HYDROCHLORIDE 0.4 MG: 2 INJECTION, SOLUTION INTRAMUSCULAR; INTRAVENOUS; SUBCUTANEOUS at 10:29

## 2018-12-01 RX ADMIN — FENTANYL CITRATE 25 MCG: 50 INJECTION, SOLUTION INTRAMUSCULAR; INTRAVENOUS at 09:37

## 2018-12-01 RX ADMIN — AMLODIPINE BESYLATE 5 MG: 5 TABLET ORAL at 12:32

## 2018-12-01 RX ADMIN — HYDRALAZINE HYDROCHLORIDE 10 MG: 20 INJECTION INTRAMUSCULAR; INTRAVENOUS at 11:16

## 2018-12-01 RX ADMIN — VENLAFAXINE 75 MG: 37.5 TABLET ORAL at 12:35

## 2018-12-01 RX ADMIN — ONDANSETRON 4 MG: 2 INJECTION INTRAMUSCULAR; INTRAVENOUS at 08:46

## 2018-12-01 RX ADMIN — HYDROMORPHONE HYDROCHLORIDE 0.2 MG: 2 INJECTION, SOLUTION INTRAMUSCULAR; INTRAVENOUS; SUBCUTANEOUS at 10:03

## 2018-12-01 NOTE — ANESTHESIA PREPROCEDURE EVALUATION
Review of Systems/Medical History  Patient summary reviewed  Chart reviewed      Cardiovascular  EKG reviewed, Hypertension ,    Pulmonary  Smoker cigarette smoker  , Tobacco cessation counseling given Cumulative Pack Years: 28,        GI/Hepatic            Endo/Other    Obesity  morbid obesity   GYN       Hematology   Musculoskeletal       Neurology    CVA , residual symptoms,    Psychology   Anxiety,              Physical Exam    Airway    Mallampati score: III  TM Distance: >3 FB  Neck ROM: full     Dental       Cardiovascular  Rhythm: regular, Rate: normal,     Pulmonary  Breath sounds clear to auscultation,     Other Findings  Poor dentition, no loose teeth       Anesthesia Plan  ASA Score- 3 Emergent    Anesthesia Type- general with ASA Monitors  Additional Monitors:   Airway Plan:         Plan Factors-    Induction- intravenous  Postoperative Plan-     Informed Consent- Anesthetic plan and risks discussed with patient

## 2018-12-01 NOTE — CONSULTS
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  She washed out initially but not seek any further care  She visited her friend in [place] and she thinks the pain finger was doing okay  When she returned finger was worse with swelling and redness  She went to [facility] where she underwent IV antibiotics but did not see any surgery consultants  Patient was discharged and follow up with ID  Patient still had persistent swelling about the digit  She presented to [address] [place] [address] yesterday with necrotic skin over the right index finger significant swelling and significant redness  Patient is unable to use the finger  MRI was obtained which demonstrated osteomyelitis of P2 as well as an abscess of the volar aspect of the right index finger  Patient was transferred to [place] [address] for further evaluation by Hand surgery  At this time patient denies any pain in the finger  She is complaining of some drainage and swelling      Review of Systems  Review of systems negative unless otherwise specified in HPI    Past Medical History  Past Medical History:   Diagnosis Date    Hypertension     Stroke [facility])        Past Surgical History  Past Surgical History:   Procedure Laterality Date    CHOLECYSTECTOMY         Current Medications  Current Facility-Administered Medications on File Prior to Encounter   Medication Dose Route Frequency Provider Last Rate Last Dose    [COMPLETED] gadobutrol injection (MULTI-DOSE) SOLN 7 mL  7 mL Intravenous Once in imaging Rudolph Mcmahon DO   7 mL at 11/30/18 1401    [COMPLETED] piperacillin-tazobactam (ZOSYN) 3 375 g in sodium chloride 0 9 % 50 mL IVPB  3 375 g Intravenous Once Lisa Waite, DO   Stopped at 11/30/18 1439    [COMPLETED] vancomycin (VANCOCIN) 250 mg in sodium chloride 0 9 % 100 mL IVPB  250 mg Intravenous Once Jasen Muir PA-C   Stopped at 11/30/18 1824    [COMPLETED] vancomycin (VANCOCIN) IVPB (premix) 1,000 mg  15 mg/kg Intravenous Once Ingrid Louise DO   Stopped at 11/30/18 1712     Current Outpatient Prescriptions on File Prior to Encounter   Medication Sig Dispense Refill    amLODIPine (NORVASC) 5 mg tablet Take 5 mg by mouth daily      aspirin (ECOTRIN) 325 mg EC tablet Take 325 mg by mouth daily      atorvastatin (LIPITOR) 40 mg tablet Take 40 mg by mouth daily      busPIRone (BUSPAR) 10 mg tablet Take 10 mg by mouth 3 (three) times a day as needed        lisinopril (ZESTRIL) 20 mg tablet Take 20 mg by mouth daily      venlafaxine (EFFEXOR) 75 mg tablet Take 75 mg by mouth daily           Recent Labs (HCT,HGB,PT,INR,ESR,CRP,GLU,HgA1C)    0  Lab Value Date/Time   HCT 38 6 11/30/2018 1204   HGB 12 4 11/30/2018 1204   WBC 9 11 11/30/2018 1204   INR 1 01 11/30/2018 1204         Physical exam  · General: Awake, Alert, Oriented  · Eyes: Pupils equal, round and reactive to light  · Heart: regular rate and rhythm  · Lungs: No audible wheezing  · Abdomen: soft  Right hand    No redness swelling or edema about the right small ring and long fingers and thumb  No swelling about the palm or dorsum of the hand  With regards to the ratings  There is significant swelling and redness  There is necrotic skin over the volar aspect of the DIP as well as over the P1 area circumferentially  Patient is nontender over the index finger  Patient cannot bend her DIP or PIP joint  Surprisingly the patient has no tenderness over this finger  The area the MCP joint the skin becomes more normal-appearing without erythema or edema  Imaging  MRI of the right hand demonstrates abscess within the right index finger volarly extending nearly the entire length of the finger  There are also areas of hypoperfusion noted distally extending proximally along the volar aspect of the finger    There is some osteomyelitis present P2  Assessment/Plan:   47 y  o female osteomyelitis of right index finger P2 with significant soft tissue swelling, infection, and abscess the volar aspect of the index finger  The options were discussed with the patient at length  Based on her skin condition of her right index finger with considerable amount of necrosis over P1 P2 and P3 I do not feel I could reliably shorten the finger to P1 with the skin condition that it is in  I feel patient would most benefit from a ray resection of the index finger  This option was discussed with her and she agreed  Risks and benefits were explained risks including not limited to infection, bleeding, nerve vessel damage, need for future surgery, loss of function of the hand  Patient understood these risks  Consent forms were obtained  Patient will be scheduled for the operating room today at [facility]   This was discussed with the OR

## 2018-12-01 NOTE — OP NOTE
OPERATIVE REPORT  PATIENT NAME: Saranya Borja    :  1965  MRN: [id number]  Pt Location: [facility] OR ROOM 01    SURGERY DATE: 2018    Surgeon(s) and Role:     * Shorty Reed DO - Primary    Preop Diagnosis:  Osteomyelitis of finger of right hand ([address] ) [M86 9]    Post-Op Diagnosis Codes:     * Osteomyelitis of finger of right hand ([address] ) [M86 9]    Procedure(s) (LRB):  DEBRIDEMENT HAND/FINGER ([address] OUT) WITH RAY AMPUTATION OF RIGHT INDEX FINGER (Right)    Specimen(s):  ID Type Source Tests Collected by Time Destination   1 : Right index finger Tissue Finger, Right TISSUE EXAM Shorty Reed,  2018 3554    A : Right index finger abscess Wound Finger, Right ANAEROBIC CULTURE AND GRAM STAIN, WOUND CULTURE Shorty Reed DO 2018 [id number]        Estimated Blood Loss:   Minimal    Drains:       Anesthesia Type:   General    Operative Indications:  Osteomyelitis of finger of right hand ([address] ) [M86 9]  Necrotic skin over the index finger P1 P2 and P3 which was not amenable to closure    Operative Findings:  Flexor tenosynovitis number next osteomyelitis of P2 number next moderate-sized abscess over the volar aspect of index finger P1 P2 a 3  Necrotic skin circumferentially around the base of the finger    Complications:   None    Procedure and Technique:  Terry Salguero is a 58-year-old female who caught herself while feeding her cat approximately 3-4 weeks ago  She wash out her wound with peroxide and put antibiotic ointment on it  She did not seek care medially  Of note 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  She did notice infection after feeding her chickens and her keys and presented to [facility] for evaluation  There she was admitted for IV antibiotics  She did not see surgery then  She was discharged on Keflex  She thought her infection get better  She continue to work on the farm   Her finger did not improve    She presented to the [facility] yesterday for evaluation where her finger was found to be significantly swollen, erythematous, with necrosis around the distal tip of the finger as well as the base over P1  She was transferred to [address] [place] [address] for further evaluation by Hand surgery  Evaluation here found necrotic skin over P1 P2 and P3 circumferentially around P1  Patient had no function of the digit  There is significant swelling finger swelling approximately 3 times than the other index finger  The options were discussed with the patient  I feel could not salvage the finger because of the significant necrosis as well as the extent of the infection as well as osteomyelitis found on MRI  The option of index finger ray amputation was presented to the patient  She agreed to this  The consent forms were obtained  Patient was scheduled for the OR  Patient was identified as lower long the preop area on the right upper extremity was marked  She was seen by the anesthesia team they deem LMA anesthesia was most appropriate  She these consent forms were obtained  Patient was transferred from the preop area to the OR again identified as Eduarda Ore  She underwent LMA intubation without complication  Tourniquet was placed on the brachium  This was padded  Patient was then positioned appropriately on underwent sterile prep and drape  Time-out was performed successfully  Everyone in the room agreement that the right index finger was the operative site  Consent form was reviewed by myself  With use of a marking pend bracket type incisions were made over the volar aspect of the finger and over dorsally  Dorsally the incision was extended proximally over the 2nd metacarpal   The limb was then elevated for 5 minutes for exsanguination  It Esmarch was not used  Tourniquet was elevated to 250 mm of mercury  First we turned our attention to the dorsal aspect of the finger  [facility] sitting with his extension proximally was made a 15 blade  Dissection was carried down through skin and subcutaneous tissue  All superficial veins were cauterized with bipolar cautery  We took care to preserve any superficial radial nerve branches  Any branches of the radial artery were also preserved  Dissection carried down to the EIP and EDC to the index finger  These were cut proximally and retracted distally  This then revealed the 2nd metacarpal   Subperiosteal dissection revealed the bone  Dissection was carried around the interossei  With use of a micro sagittal saw an oblique osteotomy was made just distal to the FCR and ECRL insertions  This was confirmed with x-ray  We then turned our attention to the volar aspect of the finger back incision was made over the A4 mentioned markings  Dissection was carried down through skin and subcutaneous tissue  We countered the radial neurovascular bundle and Bovie cautery was used to perform neurectomies as well as the Bovie the vessel  The nerve was pulled distally and bovied the lot to retract away from the incision  The artery was bovied as distal as possible as the main blood supply to the flap dissection then carried around the ulnar side of the index finger to the ulnar neurovascular bundle going to the index finger  Similar procedure was performed performed on this neurovascular bundle with the nerve being retracted distally and Bovie proximally allowed to retract and artery being bovied as distal as possible  Dissection was then carried around to the dorsum of the fingers to the other incision was met  Dissection was maintained superficial to the capsule but deep to any soft tissues preserving blood supply to the flaps  FDP and FDS tendons were cut and allowed to retract  At this point the index ray was removed  Nerve endings were identified and buried deep within the muscle musculature of the interossei  Tourniquet was released    There was good blood flow to both flaps player had brisk cap refill was noted in all the fingers  Any venous bleeding was stopped with bipolar cautery  Wound was irrigated thoroughly with normal saline solution  This was done via cysto tubing  Any dead space within the wound was closed with 4 0 PDS suture  The wound was then closed with 5 0 chromic suture  This provided cosmetically closure with 3 fingers preserving the webspace for the thumb  Patient tolerated procedure well  Dressed sterile dressing was applied  At the end of the procedure there is brisk capillary refill to the fingers and the thumb     I was present for the entire procedure    Patient Disposition:  PACU  and hemodynamically stable    SIGNATURE: Ruiz Pena DO  DATE: December 1, 2018  TIME: 10:53 AM

## 2018-12-01 NOTE — PROGRESS NOTES
Progress Note - Roya Stark 1965, 47 y o  female MRN: [id number]    Unit/Bed#: [address] Encounter: [id number]    Primary Care Provider: Mariia Hammond MD   Date and time admitted to hospital: 11/30/2018  9:08 PM        Osteomyelitis of finger of right hand [facility])   Assessment & Plan    · MRI shows findings concerning for early osteomyelitis of right 2nd middle phalanx  · IV antibiotics as noted above  ·  ID and hand surgery consult  · S/p right index ray amputation     * Abscess of right index finger   Assessment & Plan    · MRI shows abscess palmar to 2nd proximal interphalangeal joint and other developing abscesses along with tenosynovitis of the 2nd digit flexors  · Consulted hand surgery and ID  · S/p debridement and digit amputation on 11/30  · Cont IV vancomycin and cefepime for now  · Follow-up on pending blood cultures       Tobacco use   Assessment & Plan    Nicotine patch     Anxiety and depression   Assessment & Plan    Continue Effexor and BuSpar p r n  As she uses at home     Essential hypertension   Assessment & Plan    Continue Norvasc and lisinopril and monitor blood pressures     H/O: stroke   Assessment & Plan    · Reports history of CVA in January 2018 with some mild residual left arm and leg weakness  · No residual weakness noted now  · Continue statin  Restart ASA once ok with surgery           VTE Pharmacologic Prophylaxis:   Pharmacologic: none  Mechanical VTE Prophylaxis in Place: Yes    Patient Centered Rounds: I have performed bedside rounds with nursing staff today  Discussions with Specialists or Other Care Team Provider: Yes  Education and Discussions with Family / Patient:Yes  Time Spent for Care: 30 minutes  More than 50% of total time spent on counseling and coordination of care as described above      Current Length of Stay: 1 day(s)  Current Patient Status: Inpatient     Discharge Plan: pending    Code Status: Level 1 - Full Code      Subjective:   Feels fine, has some pain in the right hand but controlled with current pain meds  No other complaints  Objective:     Vitals:   Temp (24hrs), Av 1 °F (36 7 °C), Min:97 2 °F (36 2 °C), Max:99 °F (37 2 °C)    Temp:  [97 2 °F (36 2 °C)-99 °F (37 2 °C)] 98 7 °F (37 1 °C)  HR:  [64-76] 70  Resp:  [16-18] 18  BP: (127-188)/() 147/84  SpO2:  [94 %-98 %] 97 %  Body mass index is 31 93 kg/m²  Input and Output Summary (last 24 hours): Intake/Output Summary (Last 24 hours) at 18 1656  Last data filed at 18 1150   Gross per 24 hour   Intake              100 ml   Output                0 ml   Net              100 ml        Physical Exam:     Physical Exam   Constitutional: She is oriented to person, place, and time  She appears well-developed  No distress  HENT:   Head: Normocephalic and atraumatic  Cardiovascular: Normal rate, regular rhythm and normal heart sounds  Pulmonary/Chest: Effort normal and breath sounds normal  No respiratory distress  She has no wheezes  She has no rales  Abdominal: Soft  Bowel sounds are normal  She exhibits no distension  There is no tenderness  There is no rebound and no guarding  Musculoskeletal: She exhibits no edema  Neurological: She is alert and oriented to person, place, and time  Skin: Skin is warm and dry  Right hand with ace wrap and dressing in place  Psychiatric: She has a normal mood and affect  Her behavior is normal    Nursing note and vitals reviewed        Additional Data:     Labs:      Results from last 7 days  Lab Units 18  1204   WBC Thousand/uL 9 11   HEMOGLOBIN g/dL 12 4   HEMATOCRIT % 38 6   PLATELETS Thousands/uL 341   NEUTROS PCT % 68       Results from last 7 days  Lab Units 18  1204   SODIUM mmol/L 139   POTASSIUM mmol/L 3 7   CHLORIDE mmol/L 103   CO2 mmol/L 26   BUN mg/dL 16   CREATININE mg/dL 0 77   CALCIUM mg/dL 8 5   TOTAL BILIRUBIN mg/dL 0 20   ALK PHOS U/L 95   ALT U/L 71   AST U/L 27       Results from last 7 days  Lab Units 11/30/18  1204   INR  1 01         No results found for: HGBA1C        Results from last 7 days  Lab Units 11/30/18  1204   LACTIC ACID mmol/L 0 9       * I Have Reviewed All Lab Data Listed Above  * Additional Pertinent Lab Tests Reviewed: All Labs Within Last 24 Hours Reviewed    Imaging:     XR finger right second digit-index    (Results Pending)     Imaging Reports Reviewed by myself    Cultures:   Blood Culture:   Lab Results   Component Value Date    BLOODCX No Growth at 24 hrs  11/30/2018    BLOODCX No Growth at 24 hrs  11/30/2018     Urine Culture: No results found for: URINECX  Sputum Culture: No components found for: SPUTUMCX  Wound Culture: No results found for: WOUNDCULT    Last 24 Hours Medication List:     Current Facility-Administered Medications:  amLODIPine 5 mg Oral Daily Johnna Revankar, DO    atorvastatin 40 mg Oral Daily Johnna Revankar, DO    busPIRone 10 mg Oral TID PRN Johnna Revankar, DO    cefepime 2,000 mg Intravenous Q12H Johnna Revankar, DO Last Rate: 2,000 mg (12/01/18 0124)   lisinopril 20 mg Oral Daily Johnna Revankar, DO    morphine injection 2 mg Intravenous Q4H PRN Johnna Revankar, DO    nicotine 1 patch Transdermal Daily Johnna Revankar, DO    ondansetron 4 mg Intravenous Q6H PRN Johnna Revankar, DO    oxyCODONE 5 mg Oral Q6H PRN Johnna Revankar, DO    pneumococcal 23-valent polysaccharide vaccine 0 5 mL Subcutaneous Prior to discharge Johnna Revankar, DO    vancomycin 15 mg/kg Intravenous Q12H Johnna Revankar, DO Last Rate: 1,000 mg (12/01/18 1556)   venlafaxine 100 mg Oral Daily Johnna Revankar, DO    venlafaxine 75 mg Oral Daily Johnna Revankar, DO         Today, Patient Was Seen By: Twin Galan MD    ** Please Note: Dragon 360 Dictation voice to text software may have been used in the creation of this document   **

## 2018-12-01 NOTE — ASSESSMENT & PLAN NOTE
· MRI shows abscess palmar to 2nd proximal interphalangeal joint and other developing abscesses along with tenosynovitis of the 2nd digit flexors  · Consulted hand surgery and ID  · S/p debridement and digit amputation on 11/30  · Cont IV vancomycin and cefepime for now     · Follow-up on pending blood cultures

## 2018-12-01 NOTE — PLAN OF CARE
DISCHARGE PLANNING     Discharge to home or other facility with appropriate resources Progressing        INFECTION - ADULT     Absence or prevention of progression during hospitalization Progressing        PAIN - ADULT     Verbalizes/displays adequate comfort level or baseline comfort level Progressing        Potential for Falls     Patient will remain free of falls Progressing        SAFETY ADULT     Maintain or return to baseline ADL function Progressing     Maintain or return mobility status to optimal level Progressing        SKIN/TISSUE INTEGRITY - ADULT     Skin integrity remains intact Progressing     Incision(s), wounds(s) or drain site(s) healing without S/S of infection Progressing

## 2018-12-01 NOTE — ASSESSMENT & PLAN NOTE
· Reports history of CVA in January 2018 with some mild residual left arm and leg weakness  · No residual weakness noted now  · Continue statin   Restart ASA once ok with surgery

## 2018-12-01 NOTE — ASSESSMENT & PLAN NOTE
· MRI shows findings concerning for early osteomyelitis of right 2nd middle phalanx  · IV antibiotics as noted above     ·  ID and hand surgery consult  · S/p right index ray amputation

## 2018-12-01 NOTE — H&P
History and Physical - 57 [address] [facility] Internal Medicine    Patient Information: Lissa Pak 47 y o  female MRN: [id number]  Unit/Bed#: 55634 Ginger Radford Encounter: [id number]  Admitting Physician: Miguel Boyd DO  PCP: Severa Bos, MD  Date of Admission:  12/01/18        Hospital Problem List:     Principal Problem:    Abscess of right index finger  Active Problems:    Osteomyelitis of finger of right hand ([address] )    Stroke [facility])    Essential hypertension    Anxiety and depression    Tobacco use      Assessment/Plan:    * Abscess of right index finger   Assessment & Plan    MRI shows abscess palmar to 2nd proximal interphalangeal joint and other developing abscesses along with tenosynovitis of the 2nd digit flexors  Admit patient for further management  Consult hand surgery and keep patient NPO after midnight for surgery tomorrow  Place patient on IV vancomycin and cefepime for now  Consult ID for further recommendations  Follow-up on pending blood cultures       Osteomyelitis of finger of right hand [facility])   Assessment & Plan    MRI shows findings concerning for early osteomyelitis of right 2nd middle phalanx  IV antibiotics as noted above  ID and hand surgery consult     Tobacco use   Assessment & Plan    Nicotine patch     Anxiety and depression   Assessment & Plan    Continue Effexor and BuSpar p r n  As she uses at home     Essential hypertension   Assessment & Plan    Continue Norvasc and lisinopril and monitor blood pressures     Stroke [facility])   Assessment & Plan    Reports history of CVA in January 2018 with some mild residual left arm and leg weakness  Patient noted to have equal strength on exam   Continue statin  Hold aspirin for now as patient will be going to the OR tomorrow  She already took her dose for today    Restart after OR if okay by surgery             VTE Prophylaxis: Pharmacologic VTE Prophylaxis contraindicated due to Patient going to OR tomorrow  / sequential compression device   Code Status: Level 1 - Full Code    Anticipated Length of Stay:  Patient will be admitted on an Inpatient basis with an anticipated length of stay of atleast 2 midnights  Justification for Hospital Stay:  Right finger abscess, osteomyelitis, tenosynovitis    Total Time for Visit, including Counseling / Coordination of Care: 45 minutes  Greater than 50% of this total time spent on direct patient counseling and coordination of care  Chief Complaint:     No chief complaint on file  of Present Illness:    Jud Welsh is a 47 y o  female who was transferred from [facility] ER due to right index finger abscess, osteomyelitis, Tenosynovitis for evaluation by Hand surgery here  Patient is tentatively scheduled for OR tomorrow  Patient states that about 2 weeks ago she cut her right index finger on a can and was seen at [facility]   She was admitted and received 5 days of IV antibiotics  the patient states that she had blisters incised during her admission  She was discharged on Keflex which she has been compliant with  Patient states that the redness has decreased from her mid forearm to just her index finger now  the index finger; however, is now more swollen and finger tip is curved down  She also reports pain to the index finger  MRI of the right hand done at [facility] the ER showed findings consistent with abscess, early osteomyelitis and Tenosynovitis     Review of Systems:    Review of Systems   Constitutional: Positive for appetite change and fever  Negative for chills  HENT: Negative for congestion and trouble swallowing  Eyes: Negative for photophobia and visual disturbance  Respiratory: Negative for cough, chest tightness and shortness of breath  Cardiovascular: Negative for chest pain and leg swelling  Gastrointestinal: Negative for abdominal pain, blood in stool, diarrhea, nausea and vomiting  Genitourinary: Negative for dysuria, frequency and hematuria     Musculoskeletal: Positive for joint swelling  Skin: Positive for wound  Neurological: Positive for light-headedness (intermittent)  Negative for syncope and speech difficulty  Hematological: Does not bruise/bleed easily  Psychiatric/Behavioral: Negative for agitation  Past Medical and Surgical History:     Past Medical History:   Diagnosis Date    Hypertension     Stroke [facility])        Past Surgical History:   Procedure Laterality Date    CHOLECYSTECTOMY         Meds/Allergies:    PTA meds:   Prior to Admission Medications   Prescriptions Last Dose Informant Patient Reported? Taking? amLODIPine (NORVASC) 5 mg tablet 11/30/2018 at Unknown time Self Yes Yes   Sig: Take 5 mg by mouth daily   aspirin (ECOTRIN) 325 mg EC tablet 11/30/2018 at Unknown time Self Yes Yes   Sig: Take 325 mg by mouth daily   atorvastatin (LIPITOR) 40 mg tablet 11/30/2018 at Unknown time Self Yes Yes   Sig: Take 40 mg by mouth daily   busPIRone (BUSPAR) 10 mg tablet 11/29/2018 at Unknown time Self Yes Yes   Sig: Take 10 mg by mouth 3 (three) times a day as needed     lisinopril (ZESTRIL) 20 mg tablet 11/30/2018 at Unknown time Self Yes Yes   Sig: Take 20 mg by mouth daily   oxyCODONE (ROXICODONE) 5 mg immediate release tablet 11/29/2018 at Unknown time Self Yes Yes   Sig: Take 5 mg by mouth every 6 (six) hours as needed for moderate pain or severe pain   venlafaxine (EFFEXOR) 100 MG tablet 11/30/2018 at Unknown time Self Yes Yes   Sig: Take 100 mg by mouth daily   venlafaxine (EFFEXOR) 75 mg tablet 11/30/2018 at Unknown time Self Yes Yes   Sig: Take 75 mg by mouth daily        Facility-Administered Medications: None       Allergies:    Allergies   Allergen Reactions    Bactrim [Sulfamethoxazole-Trimethoprim]      History:     Marital Status: Single     Substance Use History:   History   Alcohol Use    Yes     Comment: occasional     History   Smoking Status    Current Some Day Smoker   Smokeless Tobacco    Never Used     History   Drug Use No Family History:    History reviewed  No pertinent family history  Physical Exam:     Vitals:        Physical Exam   Constitutional: She is oriented to person, place, and time  She appears well-developed and well-nourished  No distress  HENT:   Head: Normocephalic and atraumatic  Mouth/Throat: Oropharynx is clear and moist    Eyes: EOM are normal  Right eye exhibits no discharge  Left eye exhibits no discharge  No scleral icterus  Neck: Neck supple  No tracheal deviation present  Cardiovascular: Normal rate and regular rhythm  Pulmonary/Chest: Effort normal and breath sounds normal  No respiratory distress  She has no wheezes  She has no rales  Abdominal: Soft  Bowel sounds are normal  She exhibits no distension  There is no tenderness  Musculoskeletal:   Right index finger with erythema, edema  Appears dusky  Gangrenous appearance to palmar aspect over DIP joint  Warmth noted  Desquamation noted  Index finger noted to be in a mallet finger deformity    Neurological: She is alert and oriented to person, place, and time  No cranial nerve deficit  She exhibits normal muscle tone  Skin: Skin is dry  She is not diaphoretic  Psychiatric: Her mood appears anxious  Lab Results: I have personally reviewed pertinent reports  Results from last 7 days  Lab Units 11/30/18  1204   WBC Thousand/uL 9 11   HEMOGLOBIN g/dL 12 4   HEMATOCRIT % 38 6   PLATELETS Thousands/uL 341   NEUTROS PCT % 68   LYMPHS PCT % 22   MONOS PCT % 6   EOS PCT % 3       Results from last 7 days  Lab Units 11/30/18  1204   POTASSIUM mmol/L 3 7   CHLORIDE mmol/L 103   CO2 mmol/L 26   BUN mg/dL 16   CREATININE mg/dL 0 77   CALCIUM mg/dL 8 5   ALK PHOS U/L 95   ALT U/L 71   AST U/L 27       Results from last 7 days  Lab Units 11/30/18  1204   INR  1 01       Imaging: I have personally reviewed pertinent reports        Xr Finger Second Digit-index Right    Result Date: 11/30/2018  Narrative: RIGHT FINGER INDICATION:   Evaluate for osteomyelitis  Lacerated 2nd finger 2 weeks ago on a can  Worsening symptoms  COMPARISON:  None VIEWS:  XR FINGER RIGHT SECOND DIGIT-INDEX Images: 3 For the purposes of institution wide universal language the following terms will apply: (thumb=1st digit/finger, index finger=2nd digit/finger, long finger=3rd digit/finger, ring=4th digit/finger and small finger=5th digit/finger) FINDINGS: There is no acute fracture or dislocation  Mild degenerative changes throughout the hand and fingers  No lytic or blastic lesion  Significant soft tissue swelling surrounding the entire 2nd digit  No underlying radiopaque foreign bodies  Suggestion of an ulceration in the soft tissues along the dorsal surface of the distal interphalangeal joint  Punctate foci of lucency in the soft tissues overlying the ventral surface of the middle phalanx may represent subcutaneous emphysema  Mild flexion at the distal interphalangeal joint  Correlate for flexor tendon injury  Impression: 1  Diffuse soft tissue swelling surrounding the entire right 2nd digit  Lucency in the soft tissues at the distal interphalangeal joint, suspicious for soft tissue ulceration  Lucent foci in the ventral soft tissues overlying the middle phalanx, suspicious for subcutaneous emphysema  Findings consistent with diffuse cellulitis  Correlate for necrotizing fasciitis  2   Mild flexion at the distal interphalangeal joint  Correlate for flexor tendon injury  3   No evidence of acute fracture or radiopaque foreign body  I personally discussed this study with Amie Wiggins on 11/30/2018 at 12:49 PM  Workstation performed: [id number]     Mri Hand Right W Wo Contrast    Result Date: 11/30/2018  Narrative: MRI RIGHT HAND WITH AND WITHOUT CONTRAST INDICATION:   osteo  COMPARISON:  X-rays from today TECHNIQUE: MR was obtained of the right hand   Precontrast: Sagittal STIR, Axial T2 fat sat, Coronal T1, Coronal T2 fat sat, Axial T1, and axial T1 fat sat sequences were obtained  Postcontrast:   Postcontrast: axial T1 fat sat, and sagittal T1 fat sat  IV Contrast:  7 mL of gadobutrol injection (MULTI-DOSE) FINDINGS: SUBCUTANEOUS TISSUES: There is subcutaneous edema throughout the index finger  Along the palmar aspect of the 2nd distal interphalangeal joint, there is a soft tissue injury  Relative hypoperfusion is seen at the injury site, and more proximally at the  proximal interphalangeal joint, with focal T2 bright signal, measuring 7 x 7 x 6 mm  Additionally there are areas of relative hypoperfusion, without fluid signal on T2, seen more proximally in the palmar soft tissues, extending as proximally as the middle aspect of the proximal phalanx  FLEXOR/EXTENSOR TENDONS: Circumferential fluid and enhancement surrounds the 2nd flexor compartment MUSCULATURE: Normal  ARTICULAR SURFACES:  Normal  BONES: There is bone marrow edema without definite bone marrow replacement involving the 2nd middle phalanx SOFT TISSUES: Normal      Impression: 1  Findings consistent with  abscess  palmar to the 2nd proximal interphalangeal joint, measuring up to 7 mm  Additionally, there are foci of hypoperfusion extending from the injury site to the middle aspect of the proximal phalanx which are consistent with developing abscesses  Please note, necrotizing fasciitis is difficult to diagnose the imaging findings alone, and must be weighed with the clinical findings  2   Bone marrow edema within the 2nd middle phalanx concerning for early osteomyelitis 3  Tenosynovitis of the 2nd digit flexors, likely infectious  The study was marked in [facility] for immediate notification  Workstation performed: [id number]       No orders to display       EKG, Pathology, and Other Studies Reviewed on Admission:   · No EKG done    Allscripts/EPIC Records Reviewed: Yes     ** Please Note: "This note has been constructed using a voice recognition system  Therefore there may be syntax, spelling, and/or grammatical errors   Please call if you have any questions  "**

## 2018-12-01 NOTE — ED NOTES
Pt called home to get list of medications  Medication list still incomplete at this time  Pt does not know all of her medications   RN instructed patient to try to obtain a complete list       Ritesh Maria RN  11/30/18 5100

## 2018-12-01 NOTE — ANESTHESIA POSTPROCEDURE EVALUATION
Post-Op Assessment Note      CV Status:  Stable    Mental Status:  Alert and awake    Hydration Status:  Euvolemic    PONV Controlled:  Controlled    Airway Patency:  Patent    Post Op Vitals Reviewed: Yes          Staff: Anesthesiologist           /95    Temp  97 2   Pulse 73 (12/01/18 1125)   Resp 16 (12/01/18 1125)    SpO2 95 % (12/01/18 1125)        Hydralazine 10 mg IV given

## 2018-12-01 NOTE — UTILIZATION REVIEW
Initial Clinical Review: TRANSFER FROM [address] [place] [address]  Admission: Date/Time/Statement: 11/30/18 @ 2108   Orders Placed This Encounter   Procedures    Inpatient Admission     Standing Status:   Standing     Number of Occurrences:   1     Order Specific Question:   Admitting Physician     Answer:   Chirag Morfin     Order Specific Question:   Level of Care     Answer:   Med Surg [16]     Order Specific Question:   Estimated length of stay     Answer:   More than 2 Midnights     Order Specific Question:   Certification     Answer:   I certify that inpatient services are medically necessary for this patient for a duration of greater than two midnights  See H&P and MD Progress Notes for additional information about the patient's course of treatment  History of Illness: YESSICA ER NOTE  50-year-old female that presents for worsening right index finger infection  Patient states that she cut her finger on a can 2 weeks ago and was seen at [facility]   Patient was admitted and received 5 days of IV antibiotics  During her admission multiple blisters were incised but no surgical incision was created  Patient was discharged with p o  Keflex which she has been taking, along with a follow-up with Infectious Disease on December 12  Patient states that the redness has decreased from mid forearm down to just her finger  [place] However her index finger has tripled in size and now her finger tip he has been turned down  Patient has moderate tenderness to palpation along the finger     ED Vital Signs:   ED Triage Vitals   Temperature Pulse Respirations Blood Pressure SpO2   11/30/18 2111 11/30/18 2111 11/30/18 2111 11/30/18 2111 12/01/18 0334   99 °F (37 2 °C) 72 18 127/76 96 %      Temp Source Heart Rate Source Patient Position - Orthostatic VS BP Location FiO2 (%)   11/30/18 2111 11/30/18 2111 11/30/18 2111 11/30/18 2111 --   Oral Monitor Lying Left arm       Pain Score       11/30/18 2111       6        Wt Readings from Last 1 Encounters:   11/30/18 71 7 kg (158 lb 1 1 oz)   Vital Signs (abnormal): Abnormal Labs/Diagnostic Test Results:   GLUC 141 ALB 3 1   XRAY R 2ND DIGIT=1  Diffuse soft tissue swelling surrounding the entire right 2nd digit  Lucency in the soft tissues at the distal interphalangeal joint, suspicious for soft tissue ulceration  Lucent foci in the ventral soft tissues overlying the middle phalanx,   suspicious for subcutaneous emphysema  Findings consistent with diffuse cellulitis  Correlate for necrotizing fasciitis  2   Mild flexion at the distal interphalangeal joint  Correlate for flexor tendon injury  3   No evidence of acute fracture or radiopaque foreign body  MRI R HAND=1  Findings consistent with  abscess  palmar to the 2nd proximal interphalangeal joint, measuring up to 7 mm  Additionally, there are foci of hypoperfusion extending from the injury site to the middle aspect of the proximal phalanx which are consistent with developing abscesses  Please note, necrotizing fasciitis is difficult to diagnose the imaging findings alone, and must be weighed with the clinical findings  2   Bone marrow edema within the 2nd middle phalanx concerning for early osteomyelitis  3  Tenosynovitis of the 2nd digit flexors, likely infectious  Past Medical/Surgical History: Active Ambulatory Problems     Diagnosis Date Noted    No Active Ambulatory Problems     Resolved Ambulatory Problems     Diagnosis Date Noted    No Resolved Ambulatory Problems     Past Medical History:   Diagnosis Date    Hypertension     Stroke [facility])    Admitting Diagnosis: Osteomyelitis ([facility] [address] ) [M86 9]  Age/Sex: 47 y o  female  Assessment/Plan:   46 YO [address] [facility] FOR R INDEX FINGER ABSCESS, OSTEOMYELITIS, TENOSYNOVITIS  S/P TX AT ANOTHER FACILITY WITH IVABT & TRANSITIONED TO PO  PRESENTS WITH INCREASING SWELLING WITH FINGER TIP NOW CURVED DOWN & INCREASED PAIN  ADMISSION WORK-UP SHOWING ABSCESS WITH EARLY OSTEOMYELITIS  ADMITTED TO INPATIENT STATUS & STARTED ON IVABT THERAPY, ORTHO/HAND SURGEON CONSULTED  Admission Orders:  MED SURG  CONSULT ORTHO  JOSHUA  Scheduled Meds:   Current Facility-Administered Medications:  amLODIPine 5 mg Oral Daily Johnna Revankar, DO    atorvastatin 40 mg Oral Daily Johnna Revankar, DO    busPIRone 10 mg Oral TID PRN Johnna Revankar, DO    cefepime 2,000 mg Intravenous Q12H Johnna Revankar, DO    lisinopril 20 mg Oral Daily Johnna Revankar, DO    morphine injection 2 mg Intravenous Q4H PRN Johnna Revankar, DO    nicotine 1 patch Transdermal Daily Johnna Revankar, DO    ondansetron 4 mg Intravenous Q6H PRN Johnna Revankar, DO    oxyCODONE 5 mg Oral Q6H PRN Johnna Revankar, DO    pneumococcal 23-valent polysaccharide vaccine 0 5 mL Subcutaneous Prior to discharge Johnna Revankar, DO    vancomycin 15 mg/kg Intravenous Q12H Johnna Revankar, DO    venlafaxine 100 mg Oral Daily Johnna Revankar, DO    venlafaxine 75 mg Oral Daily Johnna Revankar, DO    Continuous Infusions:   lactated ringers 75 mL/hr   PRN Meds: busPIRone    morphine injection    ondansetron    oxyCODONE    pneumococcal 23-valent polysaccharide vaccine  St  [address] Review Department  Phone: [phone]; Fax [phone]  [email or web address]  ATTENTION: Please call with any questions or concerns to [phone]  and carefully listen to the prompts so that you are directed to the right person  Send all requests for admission clinical reviews, approved or denied determinations and any other requests to fax [phone]   All voicemails are confidential

## 2018-12-02 LAB
ANION GAP SERPL CALCULATED.3IONS-SCNC: 10 MMOL/L (ref 4–13)
BUN SERPL-MCNC: 12 MG/DL (ref 5–25)
CALCIUM SERPL-MCNC: 8.6 MG/DL (ref 8.3–10.1)
CHLORIDE SERPL-SCNC: 104 MMOL/L (ref 100–108)
CO2 SERPL-SCNC: 23 MMOL/L (ref 21–32)
CREAT SERPL-MCNC: 0.64 MG/DL (ref 0.6–1.3)
GFR SERPL CREATININE-BSD FRML MDRD: 103 ML/MIN/1.73SQ M
GLUCOSE SERPL-MCNC: 134 MG/DL (ref 65–140)
MRSA NOSE QL CULT: NORMAL
POTASSIUM SERPL-SCNC: 4.1 MMOL/L (ref 3.5–5.3)
SODIUM SERPL-SCNC: 137 MMOL/L (ref 136–145)

## 2018-12-02 PROCEDURE — 99232 SBSQ HOSP IP/OBS MODERATE 35: CPT | Performed by: STUDENT IN AN ORGANIZED HEALTH CARE EDUCATION/TRAINING PROGRAM

## 2018-12-02 PROCEDURE — 99024 POSTOP FOLLOW-UP VISIT: CPT | Performed by: ORTHOPAEDIC SURGERY

## 2018-12-02 PROCEDURE — 80048 BASIC METABOLIC PNL TOTAL CA: CPT | Performed by: STUDENT IN AN ORGANIZED HEALTH CARE EDUCATION/TRAINING PROGRAM

## 2018-12-02 RX ORDER — ASPIRIN 325 MG
325 TABLET, DELAYED RELEASE (ENTERIC COATED) ORAL DAILY
Status: DISCONTINUED | OUTPATIENT
Start: 2018-12-02 | End: 2018-12-03 | Stop reason: HOSPADM

## 2018-12-02 RX ADMIN — ASPIRIN 325 MG: 325 TABLET, COATED ORAL at 14:53

## 2018-12-02 RX ADMIN — VANCOMYCIN HYDROCHLORIDE 1000 MG: 1 INJECTION, SOLUTION INTRAVENOUS at 03:20

## 2018-12-02 RX ADMIN — OXYCODONE HYDROCHLORIDE 5 MG: 5 TABLET ORAL at 18:30

## 2018-12-02 RX ADMIN — VANCOMYCIN HYDROCHLORIDE 1000 MG: 1 INJECTION, SOLUTION INTRAVENOUS at 14:53

## 2018-12-02 RX ADMIN — VENLAFAXINE 100 MG: 37.5 TABLET ORAL at 08:16

## 2018-12-02 RX ADMIN — LISINOPRIL 20 MG: 20 TABLET ORAL at 08:18

## 2018-12-02 RX ADMIN — OXYCODONE HYDROCHLORIDE 5 MG: 5 TABLET ORAL at 05:17

## 2018-12-02 RX ADMIN — VENLAFAXINE 75 MG: 37.5 TABLET ORAL at 08:16

## 2018-12-02 RX ADMIN — AMLODIPINE BESYLATE 5 MG: 5 TABLET ORAL at 08:15

## 2018-12-02 RX ADMIN — Medication 2000 MG: at 01:23

## 2018-12-02 RX ADMIN — ATORVASTATIN CALCIUM 40 MG: 40 TABLET, FILM COATED ORAL at 08:19

## 2018-12-02 RX ADMIN — OXYCODONE HYDROCHLORIDE 5 MG: 5 TABLET ORAL at 12:37

## 2018-12-02 RX ADMIN — Medication 2000 MG: at 12:37

## 2018-12-02 NOTE — PROGRESS NOTES
DASH discussion completed  Discussed goals of making sure pt's needs are met upon discharge, pt's preferences are taken into account, pt understands her health condition, medications and symptoms to watch for after returning home and pt is aware of any follow up appointments recommended by hospital physician  12/02/18 [address] [place] [address]    Patient Information   Mental Status Alert   Primary Caregiver Self   Support System Extended family   Activities of Daily Living Prior to Admission   Functional Status Minimum assistance   Assistive Device Walker   Living Arrangement House   Ambulation Minimum assistance   [employer] [facility] of Transport to [facility]: License/No car     CM spoke with the pt at the bedside  Pt noted that she lives with her sister in law and that she is relatively independent with her own care  Pt denies any HHC at this time, she has a cane, walker and shower bench at home  Pt does have a license without a car, her sister in law transports her if needed  Pt is [facility], no [employer] is available to her there  Pt Reports she currently has no DCP needs    Pt uses the [facility] in [place], [place]

## 2018-12-02 NOTE — UTILIZATION REVIEW
Continued Stay Review    Date: 12/1/18    Vital Signs: /66 (BP Location: Left arm)   Pulse 66   Temp 98 4 °F (36 9 °C) (Oral)   Resp 18   Ht 4' 11" (1 499 m)   Wt 71 7 kg (158 lb 1 1 oz)   SpO2 94%   BMI 31 93 kg/m²     Medications:   Scheduled Meds:   Current Facility-Administered Medications:  amLODIPine 5 mg Oral Daily Johnna Revankar, DO    atorvastatin 40 mg Oral Daily Johnna Revankar, DO    busPIRone 10 mg Oral TID PRN Johnna Revankar, DO    cefepime 2,000 mg Intravenous Q12H Johnna Revankar, DO Last Rate: 2,000 mg (12/02/18 0123)   lisinopril 20 mg Oral Daily Ojhnna Revankar, DO    morphine injection 2 mg Intravenous Q4H PRN Johnna Revankar, DO    nicotine 1 patch Transdermal Daily Johnna Revankar, DO    ondansetron 4 mg Intravenous Q6H PRN Johnna Revankar, DO    oxyCODONE 5 mg Oral Q6H PRN Johnna Revankar, DO    pneumococcal 23-valent polysaccharide vaccine 0 5 mL Subcutaneous Prior to discharge Johnna Revankar, DO    vancomycin 15 mg/kg Intravenous Q12H Johnna Revankar, DO Last Rate: 1,000 mg (12/02/18 0320)   venlafaxine 100 mg Oral Daily Johnna Revankar, DO    venlafaxine 75 mg Oral Daily Johnna Revankar, DO      Continuous Infusions:    PRN Meds: busPIRone    morphine injection    ondansetron    oxyCODONE    pneumococcal 23-valent polysaccharide vaccine  Age/Sex: 47 y o  female     PER ORTHOPEDIC SURGEON  PT  WITH OSTEOMYELITIS OF RIGHT INDEX FINGER  P2  WITH SIGNIFICANT SOFT TISSUE SWELLING INFECTION AND ABSCESS THE VOLAR ASPECT OF THE INDEX FINGER   FINGER WITH CONSIDERABLE AMOUNT OF NECROSIS OVER P1, P2, AND P3   WOULD BENEFIT FROM A RAY RESECTION OF THE INDEX FINGER     OR  Procedure(s) (LRB):  DEBRIDEMENT HAND/FINGER ([address] OUT) WITH RAY AMPUTATION OF RIGHT INDEX FINGER (Right)  Operative Findings:  Flexor tenosynovitis number next osteomyelitis of P2 number next moderate-sized abscess over the volar aspect of index finger P1 P2 a 3  Necrotic skin circumferentially around the base of the finger    ATTENDING  Osteomyelitis of finger of right hand (HCC)   Assessment & Plan     · MRI shows findings concerning for early osteomyelitis of right 2nd middle phalanx  · IV antibiotics as noted above  ·  ID and hand surgery consult  · S/p right index ray amputation   * Abscess of right index finger   Assessment & Plan     · MRI shows abscess palmar to 2nd proximal interphalangeal joint and other developing abscesses along with tenosynovitis of the 2nd digit flexors  · Consulted hand surgery and ID  · S/p debridement and digit amputation on 11/30  · Cont IV vancomycin and cefepime for now  · Follow-up on pending blood cultures     H/O: stroke   Assessment & Plan     · Reports history of CVA in January 2018 with some mild residual left arm and leg weakness  · No residual weakness noted now  · Continue statin   Restart ASA once ok with surgery

## 2018-12-02 NOTE — ASSESSMENT & PLAN NOTE
· MRI shows abscess palmar to 2nd proximal interphalangeal joint and other developing abscesses along with tenosynovitis of the 2nd digit flexors  · Consulted hand surgery and ID  · S/p debridement and digit amputation on 11/30  · Cont IV vancomycin and cefepime, received 4 days  · Discharged home on p o   Keflex to complete a 7 day course of antibiotic  · WBAT, OT for finger/hand ROM  · On discharge advised to keep dressing intact until follow-up with he answered

## 2018-12-02 NOTE — ASSESSMENT & PLAN NOTE
· MRI showed findings concerning for early osteomyelitis of right 2nd middle phalanx  · IV antibiotics as noted above     ·  ID and hand surgery consult  · S/p right index ray amputation  · Plan as noted

## 2018-12-02 NOTE — ASSESSMENT & PLAN NOTE
· Reports history of CVA in January 2018 with some mild residual left arm and leg weakness  · No residual weakness noted now  · Continue statin  ASA

## 2018-12-02 NOTE — PROGRESS NOTES
Progress Note - Leigh Abel 1965, 47 y o  female MRN: [id number]    Unit/Bed#: [address] Encounter: [id number]    Primary Care Provider: Regan Jacob MD   Date and time admitted to hospital: 11/30/2018  9:08 PM        Osteomyelitis of finger of right hand [facility])   Assessment & Plan    · MRI showed findings concerning for early osteomyelitis of right 2nd middle phalanx  · IV antibiotics as noted above  ·  ID and hand surgery consult  · S/p right index ray amputation  · Plan as noted      * Abscess of right index finger   Assessment & Plan    · MRI shows abscess palmar to 2nd proximal interphalangeal joint and other developing abscesses along with tenosynovitis of the 2nd digit flexors  · Consulted hand surgery and ID  · S/p debridement and digit amputation on 11/30  · Cont IV vancomycin and cefepime, day 3  · Follow-up on pending operative wound cultures to allison-escalate Ab  · WBAT, OT for finger/hand ROM       Essential hypertension   Assessment & Plan    Continue Norvasc and lisinopril and monitor blood pressures     H/O: stroke   Assessment & Plan    · Reports history of CVA in January 2018 with some mild residual left arm and leg weakness  · No residual weakness noted now  · Continue statin  Restart ASA           VTE Pharmacologic Prophylaxis:   Pharmacologic: none  Mechanical VTE Prophylaxis in Place: Yes    Patient Centered Rounds: I have performed bedside rounds with nursing staff today  Discussions with Specialists or Other Care Team Provider: Yes  Education and Discussions with Family / Patient:Yes  Time Spent for Care: 30 minutes  More than 50% of total time spent on counseling and coordination of care as described above  Current Length of Stay: 2 day(s)  Current Patient Status: Inpatient     Discharge Plan: pending    Code Status: Level 1 - Full Code      Subjective:   Feels tired, right hand is sore but she can hold a cup of coffee and move her fingers ok        Objective:     Vitals: Temp (24hrs), Av °F (36 7 °C), Min:97 2 °F (36 2 °C), Max:98 7 °F (37 1 °C)    Temp:  [97 2 °F (36 2 °C)-98 7 °F (37 1 °C)] 98 4 °F (36 9 °C)  HR:  [66-76] 66  Resp:  [16-18] 18  BP: (131-188)/() 134/66  SpO2:  [94 %-97 %] 94 %  Body mass index is 31 93 kg/m²  Input and Output Summary (last 24 hours): Intake/Output Summary (Last 24 hours) at 18 0931  Last data filed at 18 1150   Gross per 24 hour   Intake              100 ml   Output                0 ml   Net              100 ml        Physical Exam:     Physical Exam   Constitutional: She is oriented to person, place, and time  She appears well-developed  No distress  HENT:   Head: Normocephalic and atraumatic  Cardiovascular: Normal rate, regular rhythm and normal heart sounds  Pulmonary/Chest: Effort normal and breath sounds normal  No respiratory distress  She has no wheezes  She has no rales  Abdominal: Soft  Bowel sounds are normal  She exhibits no distension  There is no tenderness  There is no rebound and no guarding  Musculoskeletal: She exhibits no edema  Neurological: She is alert and oriented to person, place, and time  Skin: Skin is warm and dry  Right hand with ace wrap and dressing in place  Psychiatric: She has a normal mood and affect  Her behavior is normal    Nursing note and vitals reviewed        Additional Data:     Labs:      Results from last 7 days  Lab Units 18  1204   WBC Thousand/uL 9 11   HEMOGLOBIN g/dL 12 4   HEMATOCRIT % 38 6   PLATELETS Thousands/uL 341   NEUTROS PCT % 68       Results from last 7 days  Lab Units 18  0512 18  1204   SODIUM mmol/L 137 139   POTASSIUM mmol/L 4 1 3 7   CHLORIDE mmol/L 104 103   CO2 mmol/L 23 26   BUN mg/dL 12 16   CREATININE mg/dL 0 64 0 77   CALCIUM mg/dL 8 6 8 5   TOTAL BILIRUBIN mg/dL  --  0 20   ALK PHOS U/L  --  95   ALT U/L  --  71   AST U/L  --  27       Results from last 7 days  Lab Units 18  1204   INR  1 01         No results found for: HGBA1C        Results from last 7 days  Lab Units 11/30/18  1204   LACTIC ACID mmol/L 0 9       * I Have Reviewed All Lab Data Listed Above  * Additional Pertinent Lab Tests Reviewed: All Labs Within Last 24 Hours Reviewed    Imaging:     XR finger right second digit-index   Final Result by Kelby Hou MD (12/01 5653)      Fluoroscopic guidance provided for surgical procedure  Please refer to the separate procedure notes for additional details            Workstation performed: [id number]           Imaging Reports Reviewed by myself    Cultures:   Blood Culture:   Lab Results   Component Value Date    BLOODCX No Growth at 24 hrs  11/30/2018    BLOODCX No Growth at 24 hrs  11/30/2018     Urine Culture: No results found for: URINECX  Sputum Culture: No components found for: SPUTUMCX  Wound Culture: No results found for: WOUNDCULT    Last 24 Hours Medication List:     Current Facility-Administered Medications:  amLODIPine 5 mg Oral Daily Johnna Revankar, DO    atorvastatin 40 mg Oral Daily Johnna Revankar, DO    busPIRone 10 mg Oral TID PRN Johnna Revankar, DO    cefepime 2,000 mg Intravenous Q12H Johnna Revankar, DO Last Rate: 2,000 mg (12/02/18 0123)   lisinopril 20 mg Oral Daily Johnna Revankar, DO    morphine injection 2 mg Intravenous Q4H PRN Johnna Revankar, DO    nicotine 1 patch Transdermal Daily Johnna Revankar, DO    ondansetron 4 mg Intravenous Q6H PRN Johnna Revankar, DO    oxyCODONE 5 mg Oral Q6H PRN Johnna Revankar, DO    pneumococcal 23-valent polysaccharide vaccine 0 5 mL Subcutaneous Prior to discharge Johnna Revankar, DO    vancomycin 15 mg/kg Intravenous Q12H Johnna Revankar, DO Last Rate: 1,000 mg (12/02/18 0320)   venlafaxine 100 mg Oral Daily Johnna Revankar, DO    venlafaxine 75 mg Oral Daily Johnna Revankar, DO         Today, Patient Was Seen By: Peter Patten MD    ** Please Note: Dragon 360 Dictation voice to text software may have been used in the creation of this document   **

## 2018-12-02 NOTE — PROGRESS NOTES
Orthopedics   Harinder Puente 47 y o  female MRN: [id number]  Unit/Bed#: [address] [address]      Subjective:  47 y  o female post operative day 1 right upper extremity Index Ray amputation  Pt doing well  Pain controlled  No fevers or chills overnight      Labs:    0  Lab Value Date/Time   HCT 38 6 11/30/2018 1204   HGB 12 4 11/30/2018 1204   INR 1 01 11/30/2018 1204   WBC 9 11 11/30/2018 1204       Meds:    Current Facility-Administered Medications:     amLODIPine (NORVASC) tablet 5 mg, 5 mg, Oral, Daily, Johnna Revankar, DO, 5 mg at 12/01/18 1232    atorvastatin (LIPITOR) tablet 40 mg, 40 mg, Oral, Daily, Johnna Revankar, DO, 40 mg at 12/01/18 1233    busPIRone (BUSPAR) tablet 10 mg, 10 mg, Oral, TID PRN, Johnna Revankar, DO    cefepime (MAXIPIME) 2,000 mg in dextrose 5 % 50 mL IVPB, 2,000 mg, Intravenous, Q12H, Johnna Revankar, DO, Last Rate: 100 mL/hr at 12/02/18 0123, 2,000 mg at 12/02/18 0123    lisinopril (ZESTRIL) tablet 20 mg, 20 mg, Oral, Daily, Johnna Revankar, DO, 20 mg at 12/01/18 1232    morphine injection 2 mg, 2 mg, Intravenous, Q4H PRN, Johnna Revankar, DO, 2 mg at 12/01/18 2006    nicotine (NICODERM CQ) 14 mg/24hr TD 24 hr patch 1 patch, 1 patch, Transdermal, Daily, Johnna Revankar, DO    ondansetron (ZOFRAN) injection 4 mg, 4 mg, Intravenous, Q6H PRN, Johnna Revankar, DO, 4 mg at 12/01/18 1236    oxyCODONE (ROXICODONE) IR tablet 5 mg, 5 mg, Oral, Q6H PRN, Johnna Revankar, DO, 5 mg at 12/02/18 0517    pneumococcal 23-valent polysaccharide vaccine (PNEUMOVAX-23) injection 0 5 mL, 0 5 mL, Subcutaneous, Prior to discharge, Johnna Revankar, DO    vancomycin (VANCOCIN) IVPB (premix) 1,000 mg, 15 mg/kg, Intravenous, Q12H, Johnna Dickerson DO, Last Rate: 200 mL/hr at 12/02/18 0320, 1,000 mg at 12/02/18 0320    venlafaxine [facility]) tablet 100 mg, 100 mg, Oral, Daily, Johnna Dickerson DO, 100 mg at 12/01/18 1235    venlafaxine (EFFEXOR) tablet 75 mg, 75 mg, Oral, Daily, Johnna Dickerson DO, 75 mg at 12/01/18 1235    Blood Culture:   Lab Results   Component Value Date    BLOODCX No Growth at 24 hrs  11/30/2018       Wound Culture:   No results found for: WOUNDCULT    Ins and Outs:  I/O last 24 hours: In: 100 [I V :100]  Out: 0           Physical Exam:  Vitals:    12/01/18 2045   BP: 131/62   Pulse: 70   Resp: 18   Temp: 98 7 °F (37 1 °C)   SpO2: 94%     right upper extremity  · Dressing CDI  · Sensation intact Axillary, Musculocutaneous, Radial, Ulna, Median nerves  · Motor intact to Axillary, Musculocutaneous, Radial, Ulna, Median nerves  · 2+ radial pulse  · Comp soft  · Bcr all digits  ·     _*_*_*_*_*_*_*_*_*_*_*_*_*_*_*_*_*_*_*_*_*_*_*_*_*_*_*_*_*_*_*_*_*_*_*_*_*_*_*_*_*    Assessment: 47 y  o female post operative day 1 right upper extremity Incision and Drainage   Doing well    Plan:   · Up and out of bed  · weight bearing as lis to right upper extremity  · PT/OT  · DVT prophylaxis  · Analgesics  · D/C planning  · Will need Abx, awaiting cultures  · OT for finger ROM          Jean Pierre Carr DO

## 2018-12-02 NOTE — UTILIZATION REVIEW
Continued Stay Review    Date: 12/2/18    Vital Signs: /66 (BP Location: Left arm)   Pulse 66   Temp 98 4 °F (36 9 °C) (Oral)   Resp 18   Ht 4' 11" (1 499 m)   Wt 71 7 kg (158 lb 1 1 oz)   SpO2 94%   BMI 31 93 kg/m²     Medications:   Scheduled Meds:   Current Facility-Administered Medications:  amLODIPine 5 mg Oral Daily Johnna Revankar, DO    atorvastatin 40 mg Oral Daily Johnna Revankar, DO    busPIRone 10 mg Oral TID PRN Johnna Revankar, DO    cefepime 2,000 mg Intravenous Q12H Johnna Revankar, DO Last Rate: 2,000 mg (12/02/18 0123)   lisinopril 20 mg Oral Daily Johnna Revankar, DO    morphine injection 2 mg Intravenous Q4H PRN Johnna Revankar, DO    nicotine 1 patch Transdermal Daily Johnna Revankar, DO    ondansetron 4 mg Intravenous Q6H PRN Johnna Revankar, DO    oxyCODONE 5 mg Oral Q6H PRN Johnna Revankar, DO    pneumococcal 23-valent polysaccharide vaccine 0 5 mL Subcutaneous Prior to discharge Johnna Revankar, DO    vancomycin 15 mg/kg Intravenous Q12H Johnna Revankar, DO Last Rate: 1,000 mg (12/02/18 0320)   venlafaxine 100 mg Oral Daily Johnna Revankar, DO    venlafaxine 75 mg Oral Daily Johnna Revankar, DO      Continuous Infusions:    PRN Meds: busPIRone    morphine injection    ondansetron    oxyCODONE    pneumococcal 23-valent polysaccharide vaccine    Abnormal Labs/Diagnostic Results:     Age/Sex: 47 y o  female     POD 1 RIGHT UPPER EXTREMITY INCISION AND DRAINAGE   CONTINUE DOUBLE  IV ATBX PENDING CULTURES PAIN MANAGEMENT   right upper extremity  · Dressing CDI  · Sensation intact Axillary, Musculocutaneous, Radial, Ulna, Median nerves  · Motor intact to Axillary, Musculocutaneous, Radial, Ulna, Median nerves  · 2+ radial pulse  · Comp soft  · Bcr all digits

## 2018-12-03 VITALS
HEART RATE: 67 BPM | BODY MASS INDEX: 31.87 KG/M2 | TEMPERATURE: 99.2 F | DIASTOLIC BLOOD PRESSURE: 78 MMHG | OXYGEN SATURATION: 94 % | SYSTOLIC BLOOD PRESSURE: 122 MMHG | WEIGHT: 158.07 LBS | HEIGHT: 59 IN | RESPIRATION RATE: 20 BRPM

## 2018-12-03 LAB
ANION GAP SERPL CALCULATED.3IONS-SCNC: 10 MMOL/L (ref 4–13)
ATRIAL RATE: 54 BPM
BUN SERPL-MCNC: 15 MG/DL (ref 5–25)
CALCIUM SERPL-MCNC: 8.9 MG/DL (ref 8.3–10.1)
CHLORIDE SERPL-SCNC: 104 MMOL/L (ref 100–108)
CO2 SERPL-SCNC: 27 MMOL/L (ref 21–32)
CREAT SERPL-MCNC: 0.77 MG/DL (ref 0.6–1.3)
ERYTHROCYTE [DISTWIDTH] IN BLOOD BY AUTOMATED COUNT: 12.9 % (ref 11.6–15.1)
GFR SERPL CREATININE-BSD FRML MDRD: 89 ML/MIN/1.73SQ M
GLUCOSE SERPL-MCNC: 91 MG/DL (ref 65–140)
HCT VFR BLD AUTO: 40.3 % (ref 34.8–46.1)
HGB BLD-MCNC: 12.7 G/DL (ref 11.5–15.4)
MCH RBC QN AUTO: 29.5 PG (ref 26.8–34.3)
MCHC RBC AUTO-ENTMCNC: 31.5 G/DL (ref 31.4–37.4)
MCV RBC AUTO: 94 FL (ref 82–98)
P AXIS: -8 DEGREES
PLATELET # BLD AUTO: 408 THOUSANDS/UL (ref 149–390)
PMV BLD AUTO: 9.6 FL (ref 8.9–12.7)
POTASSIUM SERPL-SCNC: 4.3 MMOL/L (ref 3.5–5.3)
PR INTERVAL: 122 MS
QRS AXIS: 35 DEGREES
QRSD INTERVAL: 84 MS
QT INTERVAL: 478 MS
QTC INTERVAL: 453 MS
RBC # BLD AUTO: 4.3 MILLION/UL (ref 3.81–5.12)
SODIUM SERPL-SCNC: 141 MMOL/L (ref 136–145)
T WAVE AXIS: 46 DEGREES
VENTRICULAR RATE: 54 BPM
WBC # BLD AUTO: 8.17 THOUSAND/UL (ref 4.31–10.16)

## 2018-12-03 PROCEDURE — 97110 THERAPEUTIC EXERCISES: CPT

## 2018-12-03 PROCEDURE — G8988 SELF CARE GOAL STATUS: HCPCS

## 2018-12-03 PROCEDURE — 80048 BASIC METABOLIC PNL TOTAL CA: CPT | Performed by: STUDENT IN AN ORGANIZED HEALTH CARE EDUCATION/TRAINING PROGRAM

## 2018-12-03 PROCEDURE — 97530 THERAPEUTIC ACTIVITIES: CPT

## 2018-12-03 PROCEDURE — 97167 OT EVAL HIGH COMPLEX 60 MIN: CPT

## 2018-12-03 PROCEDURE — 93010 ELECTROCARDIOGRAM REPORT: CPT | Performed by: INTERNAL MEDICINE

## 2018-12-03 PROCEDURE — 99024 POSTOP FOLLOW-UP VISIT: CPT | Performed by: ORTHOPAEDIC SURGERY

## 2018-12-03 PROCEDURE — 97163 PT EVAL HIGH COMPLEX 45 MIN: CPT

## 2018-12-03 PROCEDURE — G8978 MOBILITY CURRENT STATUS: HCPCS

## 2018-12-03 PROCEDURE — G8987 SELF CARE CURRENT STATUS: HCPCS

## 2018-12-03 PROCEDURE — 90732 PPSV23 VACC 2 YRS+ SUBQ/IM: CPT | Performed by: FAMILY MEDICINE

## 2018-12-03 PROCEDURE — 99239 HOSP IP/OBS DSCHRG MGMT >30: CPT | Performed by: STUDENT IN AN ORGANIZED HEALTH CARE EDUCATION/TRAINING PROGRAM

## 2018-12-03 PROCEDURE — G8979 MOBILITY GOAL STATUS: HCPCS

## 2018-12-03 PROCEDURE — 85027 COMPLETE CBC AUTOMATED: CPT | Performed by: STUDENT IN AN ORGANIZED HEALTH CARE EDUCATION/TRAINING PROGRAM

## 2018-12-03 PROCEDURE — 99254 IP/OBS CNSLTJ NEW/EST MOD 60: CPT | Performed by: INTERNAL MEDICINE

## 2018-12-03 RX ORDER — OXYCODONE HYDROCHLORIDE 5 MG/1
5 TABLET ORAL EVERY 6 HOURS PRN
Qty: 15 TABLET | Refills: 0 | Status: SHIPPED | OUTPATIENT
Start: 2018-12-03 | End: 2018-12-08

## 2018-12-03 RX ORDER — NICOTINE 21 MG/24HR
1 PATCH, TRANSDERMAL 24 HOURS TRANSDERMAL DAILY
Qty: 28 PATCH | Refills: 0 | Status: SHIPPED | OUTPATIENT
Start: 2018-12-04 | End: 2020-04-01

## 2018-12-03 RX ORDER — CEPHALEXIN 500 MG/1
500 CAPSULE ORAL EVERY 6 HOURS SCHEDULED
Qty: 20 CAPSULE | Refills: 0 | Status: SHIPPED | OUTPATIENT
Start: 2018-12-03 | End: 2018-12-08

## 2018-12-03 RX ORDER — CEPHALEXIN 500 MG/1
500 CAPSULE ORAL EVERY 6 HOURS SCHEDULED
Status: DISCONTINUED | OUTPATIENT
Start: 2018-12-03 | End: 2018-12-03 | Stop reason: HOSPADM

## 2018-12-03 RX ADMIN — VENLAFAXINE 100 MG: 37.5 TABLET ORAL at 09:26

## 2018-12-03 RX ADMIN — ATORVASTATIN CALCIUM 40 MG: 40 TABLET, FILM COATED ORAL at 09:22

## 2018-12-03 RX ADMIN — ASPIRIN 325 MG: 325 TABLET, COATED ORAL at 09:25

## 2018-12-03 RX ADMIN — AMLODIPINE BESYLATE 5 MG: 5 TABLET ORAL at 09:23

## 2018-12-03 RX ADMIN — VENLAFAXINE 75 MG: 37.5 TABLET ORAL at 09:26

## 2018-12-03 RX ADMIN — PNEUMOCOCCAL VACCINE POLYVALENT 0.5 ML
25; 25; 25; 25; 25; 25; 25; 25; 25; 25; 25; 25; 25; 25; 25; 25; 25; 25; 25; 25; 25; 25; 25 INJECTION, SOLUTION INTRAMUSCULAR; SUBCUTANEOUS at 06:36

## 2018-12-03 RX ADMIN — Medication 2000 MG: at 01:02

## 2018-12-03 RX ADMIN — VANCOMYCIN HYDROCHLORIDE 1000 MG: 1 INJECTION, SOLUTION INTRAVENOUS at 03:50

## 2018-12-03 RX ADMIN — CEPHALEXIN 500 MG: 500 CAPSULE ORAL at 17:25

## 2018-12-03 RX ADMIN — CEPHALEXIN 500 MG: 500 CAPSULE ORAL at 11:47

## 2018-12-03 RX ADMIN — LISINOPRIL 20 MG: 20 TABLET ORAL at 09:25

## 2018-12-03 NOTE — PROGRESS NOTES
47 y o female POD #2 s/p I&D R hand and index finger ray amputation  Using her hand well  No fevers or chills overnight  No numbness or tingling  Final cx pending    Review of Systems  Review of systems negative unless otherwise specified in HPI    Past Medical History  Past Medical History:   Diagnosis Date    Hypertension     Stroke [facility])        Past Surgical History  Past Surgical History:   Procedure Laterality Date    CHOLECYSTECTOMY         Current Medications  No current facility-administered medications on file prior to encounter  Current Outpatient Prescriptions on File Prior to Encounter   Medication Sig Dispense Refill    amLODIPine (NORVASC) 5 mg tablet Take 5 mg by mouth daily      aspirin (ECOTRIN) 325 mg EC tablet Take 325 mg by mouth daily      atorvastatin (LIPITOR) 40 mg tablet Take 40 mg by mouth daily      busPIRone (BUSPAR) 10 mg tablet Take 10 mg by mouth 3 (three) times a day as needed        lisinopril (ZESTRIL) 20 mg tablet Take 20 mg by mouth daily      venlafaxine (EFFEXOR) 75 mg tablet Take 75 mg by mouth daily           Recent Labs (HCT,HGB,PT,INR,ESR,CRP,GLU,HgA1C)    0  Lab Value Date/Time   HCT 40 3 12/03/2018 0649   HGB 12 7 12/03/2018 0649   WBC 8 17 12/03/2018 0649   INR 1 01 11/30/2018 1204       Physical exam  · General: Awake, Alert, Oriented  · Eyes: Pupils equal, round and reactive to light  · Heart: regular rate and rhythm  · Lungs: No audible wheezing  · Abdomen: soft    R hand  Incision healing well  Flaps with bcr  No purulence  Fdp/fds/ext intact  sens intact all digits m/u/r aspects  Mild tender  Mild swelling  Comp soft      Assessment/Plan:   47 y  o female s/p I &D and index ray amputation  OT for hand ROM  Abx per primary  Cultures pending, likely will be negative, patient has been on Abx for 3 weeks  Monitor wound  DC planning

## 2018-12-03 NOTE — CONSULTS
Consultation - Infectious Disease   Matheus Fierro 47 y o  female MRN: [id number]  Unit/Bed#: [address] [address] Encounter: [id number]      IMPRESSION & RECOMMENDATIONS:   Impression/Recommendations:  1  Right index finger osteomyelitis with necrotic tissue status post injury on a can  She is now postoperative day 2 amputation of the right 2nd finger  Cultures from this admission remain negative, although cultures obtained on 11/23/2018 at [facility] revealed group a strep and MSSA  Rec:   · Continue local wound care as per hand surgery  · She has now received 2 days of IV Vancomycin and Cefepime post op  No visible cellulitis  Suggest to discontinue antibiotics at this time  2   History of Bactrim allergy  3  History of stroke  4  Hypertension  5  Anxiety and depression    Antibiotics:  Vancomycin/Cefepime POD #2    Thank you for this consultation  We will follow along with you  HISTORY OF PRESENT ILLNESS:  Reason for Consult: osteomyelitis R index finger    HPI: Matheus Fierro is a 47 y o  female presented to [facility] ED on 11/30/2018 with complaints of worsening infection of the right index finger  Patient initially cut her finger on a can while feeding her cat  She took care of the wound on her own by applying antibiotic ointment  Thanksgiving morning she had multiple blisters overlying the index finger  She sought treatment at [facility]   She was admitted on 11/22 and discharged home on 11/26/18  Her blood cultures on 11/22/2018 remain negative  A wound culture from 11/23/2018 grew group a strep and MSSA  While in the hospital she did have an MRI which did not reveal osteomyelitis  She was on IV cefazolin and Flagyl and was eventually discharged home with 6 more days of cephalexin  Patient states she did receive a tetanus shot in [facility]   While still on oral antibiotics the patient had persistent swelling and developed necrotic skin with erythema    She was unable to use the digit  Patient was transferred [facility] to [address] [place] [address] for hand surgery evaluation  She was started on cefepime and vancomycin  Repeat MRI revealed Findings consistent with  abscess  palmar to the 2nd proximal interphalangeal joint, measuring up to 7 mm, Additionally, there are foci of hypoperfusion extending from the injury site to the middle aspect of the proximal phalanx which are consistent   with developing abscesses,  Please note, necrotizing fasciitis is difficult to diagnose the imaging findings alone, and must be weighed with the clinical findings, Bone marrow edema within the 2nd middle phalanx concerning for early osteomyelitis, Tenosynovitis of the 2nd digit flexors, likely infectious  She was taken to the OR on 12/01/2018 at which point she underwent amputation of the right index finger  She is now postop day 2  Her blood cultures and wound cultures from this hospitalization remain negative to date  Of note the patient has many animals at home  She states she has 4 dogs, 3 cats, turkeys and chickens for which she cares for  REVIEW OF SYSTEMS:  Constitutional:  Denies fevers or chills  HEENT:  Denies headache  Cardiovascular:  Denies chest pains  Pulmonary:  Denies shortness of breath or coughing  GI:  Denies diarrhea  :  Denies dysuria  Back:  Denies back pain  Skin:  Denies rash  Musculoskeletal:  Denies arthralgias or myalgias  Extremities:  Denies edema of lower extremities  A complete system-based review of systems is otherwise negative      PAST MEDICAL HISTORY:  Past Medical History:   Diagnosis Date    Hypertension     Stroke [facility])      Past Surgical History:   Procedure Laterality Date    CHOLECYSTECTOMY      HAND DEBRIDEMENT Right 12/1/2018    Procedure: DEBRIDEMENT HAND/FINGER [facility] OUT) WITH RAY AMPUTATION OF RIGHT INDEX FINGER;  Surgeon: Esteban Greenwood DO;  Location: [address] [place] [address];  Service: Orthopedics       FAMILY HISTORY:  Non-contributory    SOCIAL HISTORY:  History   Alcohol Use    Yes     Comment: occasional     History   Drug Use No     History   Smoking Status    Current Some Day Smoker   Smokeless Tobacco    Never Used       ALLERGIES:  Allergies   Allergen Reactions    Bactrim [Sulfamethoxazole-Trimethoprim]        MEDICATIONS:  All current active medications have been reviewed  PHYSICAL EXAM:  Vitals:  Temp:  [98 9 °F (37 2 °C)-99 2 °F (37 3 °C)] 98 9 °F (37 2 °C)  HR:  [64-88] 64  Resp:  [20] 20  BP: (135-146)/(65-80) 135/80  SpO2:  [94 %-97 %] 94 %  Temp (24hrs), Av 1 °F (37 3 °C), Min:98 9 °F (37 2 °C), Max:99 2 °F (37 3 °C)  Current: Temperature: 98 9 °F (37 2 °C)     Physical Exam:  General:  Well-nourished, well-developed, in no acute distress  Eyes:  Conjunctive clear with no hemorrhages or effusions  Oropharynx:  No ulcers, no lesions  Neck:  Supple, no lymphadenopathy  Lungs:  Clear to auscultation bilaterally, no accessory muscle use  Cardiac:  Regular rate and rhythm, no murmurs  Abdomen:  Soft, non-tender, non-distended  :  No Rivera  Back:  Nontender to palpation  Extremities:  Right hand incision site is clean dry and intact  There is no surrounding erythema  Patient has good range of motion of her remaining digits  She has full range of motion of the wrist   Skin:  No rashes, no ulcers, IV site without erythema or cord  Neurological:  Moves all four extremities spontaneously, sensation grossly intact      LABS, IMAGING, & OTHER STUDIES:  Lab Results:  I have personally reviewed pertinent labs      Results from last 7 days  Lab Units 18  0649 18  0512 18  1204   POTASSIUM mmol/L 4 3 4 1 3 7   CHLORIDE mmol/L 104 104 103   CO2 mmol/L 27 23 26   BUN mg/dL 15 12 16   CREATININE mg/dL 0 77 0 64 0 77   EGFR ml/min/1 73sq m 89 103 89   CALCIUM mg/dL 8 9 8 6 8 5   AST U/L  --   --  27   ALT U/L  --   --  71   ALK PHOS U/L  --   --  95       Results from last 7 days  Lab Units 12/03/18  0649 11/30/18  1204   WBC Thousand/uL 8 17 9 11   HEMOGLOBIN g/dL 12 7 12 4   PLATELETS Thousands/uL 408* 341       Results from last 7 days  Lab Units 12/01/18  0951 11/30/18  2244 11/30/18  1217 11/30/18  1204   BLOOD CULTURE   --   --  No Growth at 48 hrs  No Growth at 48 hrs  GRAM STAIN RESULT  No Polys or Bacteria seen  --   --   --    WOUND CULTURE  No growth  --   --   --    MRSA CULTURE ONLY   --  No Methicillin Resistant Staphlyococcus aureus (MRSA) isolated  --   --          Imaging Studies:   I have personally reviewed pertinent imaging study reports and images in PACS  EKG, Pathology, and Other Studies:   I have personally reviewed pertinent reports

## 2018-12-03 NOTE — OCCUPATIONAL THERAPY NOTE
Occupational Therapy Evaluation/Treatment       12/03/18 2688   Note Type   Note type Eval/Treat   Restrictions/Precautions   Other Precautions Pain;O2  (s/p right index ray amputation)   Pain Assessment   Pain Assessment 0-10   Pain Score 4   Pain Type Surgical pain;Acute pain   Pain Location Hand   Pain Orientation Right   Home Living   Type of Home Mobile home   Home Layout One level;Stairs to enter with rails  (3 RAMIREZ)   Bathroom Shower/Tub Tub/shower unit   Bathroom Toilet Standard   Bathroom Equipment Shower chair;Hand-held shower   Bathroom Accessibility Accessible   Home Equipment Quad cane;Cane  (rollator, RW)   Prior Function   Level of [place] Independent with ADLs and functional mobility; Needs assistance with IADLs   Lives With Family  (sis-in-law)   Receives Help From Family   ADL Assistance Independent   IADLs Needs assistance   Falls in the last 6 months 0   Comments sis-in-law does cooking, cleaning  Pt helps with laundry and shopping as able  Walks primarily with SPC PTA; has 4 dogs, 3 cats, turkeys and chickens for which she cares for  Dale Nory Psychosocial   Psychosocial (WDL) WDL   Subjective   Subjective "The pain is not all gone yet "   ADL   Where Assessed Edge of bed   Eating Assistance 5  Supervision/Setup   Grooming Assistance 4  Minimal Assistance   UB Bathing Assistance 3  Moderate Assistance   LB Bathing Assistance 4  Minimal Assistance   [address] 4  Minimal Assistance   LB Dressing Assistance 4  Minimal [address] [place] [address]  4  Minimal Assistance   Bed Mobility   Supine to Sit 7  Independent   Sit to Supine 7  Independent   Transfers   Sit to Stand 5  Supervision   Stand to Sit 5  Supervision   Balance   Static Sitting Fair +   Dynamic Sitting Fair +   Static Standing Fair   Dynamic Standing Fair -   Activity Tolerance   Activity Tolerance Patient limited by pain; Patient limited by fatigue   Nurse Made Aware Tana Draper RN   RUE Assessment   RUE Assessment X  Critical access hospital, but limited only by dressing bandages)   Edema   RUE Edema Non-pitting   LUE Assessment   LUE Assessment WFL  (gross strength 3+/5)   Hand Function   Gross Motor Coordination Functional   Fine Motor Coordination Impaired   Sensation   Light Touch No apparent deficits   Proprioception   Proprioception No apparent deficits   Vision-Basic Assessment   Current Vision Wears glasses only for reading   Cognition   Overall Cognitive Status WFL   Assessment   Limitation Decreased ADL status; Decreased UE strength;Decreased UE ROM; Decreased endurance;Decreased fine motor control;Decreased self-care trans;Decreased high-level ADLs  (decreased balance)   Prognosis Good   Assessment Patient evaluated by Occupational Therapy  Patient admitted with Abscess of right index finger, s/p s/p I &D and index ray amputation on 12/1/18  The patients occupational profile, medical and therapy history includes a extensive additional review of physical, cognitive, or psychosocial history related to current functional performance  Comorbidities affecting functional mobility and ADLS include: anxiety, CVA, depression and hypertension , left sided residual weakness and tobacco use  Prior to admission, patient was independent with functional mobility with walking stick/SPC, independent with ADLS, requiring assist for IADLS, living with sister-in-law in a one level home with 3 steps to enter, ambulating household distance and ambulating community distances  The evaluation identifies the following performance deficits: weakness, impaired balance, decreased coordination, increased fall risk, decreased ADLS, decreased IADLS, pain, decreased activity tolerance, ortheopedic restrictions and decreased strength, that result in activity limitations and/or participation restrictions   This evaluation requires clinical decision making of high complexity, because the patient presents with comorbidites that affect occupational performance and required significant modification of tasks or assistance with consideration of multiple treatment options  The Barthel Index was used as a functional outcome tool presenting with a score of 50, indicating marked limitations of functional mobility and ADLS  Patient will benefit from skilled Occupational Therapy services to address above deficits and facilitate a safe return to prior level of function  Goals   Patient Goals "get the stitches out and take a shower"   STG Time Frame (1-7)   Short Term Goal #1 Patient will increase functional mobility to and from bathroom with assistive device with supervision to increase performance with ADLS and to use a toilet; Patient will tolerate 10 minutes of UE ROM/strengthening to increase general activity tolerance and performance in ADLS/IADLS; Patient will be able to to verbalize understanding and perform energy conservation/proper body mechanics during ADLS and functional mobility at least 75% of the time with minimal cueing to decrease signs of fatigue and increase stamina to return to prior level of function; Patient will increase static/dynamic sitting balance to independent to improve the ability to sit at edge of bed or on a chair for ADLS;  Patient will increase static/dynamic standing balance to supervision to improve postural stability and decrease fall risk during standing ADLS and transfers       LTG Time Frame (8-14)   Long Term Goal #1 Patient will increase functional mobility to and from bathroom with assistive device independently to increase performance with ADLS and to use a toilet; Patient will tolerate 15 minutes of UE ROM/strengthening to increase general activity tolerance and performance in ADLS/IADLS; Patient will improve functional activity tolerance to 20 minutes of sustained functional tasks to increase participation in basic self-care and decrease assistance level;  Patient will be able to to verbalize understanding and perform energy conservation/proper body mechanics during ADLS and functional mobility at least 90% of the time with nocueing to decrease signs of fatigue and increase stamina to return to prior level of function;Patient will increase static/dynamic standing balance to independent to improve postural stability and decrease fall risk during standing ADLS and transfers  Functional Transfer Goals   Pt Will Perform All Functional Transfers With mod indep; With assistive devices; With good judgment/safety  (LTG- Independent with SPC)   ADL Goals   Pt Will Perform All ADL's At edge of bed; With stand by assist;With good judgment/safety  (LTG - Independent)   Plan   Treatment Interventions ADL retraining;Functional transfer training;UE strengthening/ROM; Endurance training;Patient/family training;Equipment evaluation/education; Fine motor coordination activities; Compensatory technique education   Goal Expiration Date 12/17/18   Treatment Day 1   OT Frequency 3-5x/wk   Additional Treatment Session   Start Time 1350   End Time 1408   Treatment Assessment Pt seen for RUE HEP consisting of gentle AROM shoulder to wrist to prevent stiffness and decrease edema  Pt allowed to use right hand as tolerated, but dressing to remain dry until ortho follow-up  Education and training begun regarding energy conservation/work simplification techniques to maximize independence with ADL/IADL while adhering to weight bearing precautions  Recommend out-pt Occupational Therapy when cleared by ortho MD     Recommendation   OT Discharge Recommendation Outpatient OT   Barthel Index   Feeding 5   Bathing 0   Grooming Score 0   Dressing Score 5   Bladder Score 10   Bowels Score 10   Toilet Use Score 5   Transfers (Bed/Chair) Score 10   Mobility (Level Surface) Score 0   Stairs Score 5   Barthel Index Score 50   Licensure   [place] License Number  Ashanti Bonnerelda Bereket [address], OTR/L, [place] Lic# [id number]

## 2018-12-03 NOTE — PLAN OF CARE
DISCHARGE PLANNING     Discharge to home or other facility with appropriate resources Progressing        DISCHARGE PLANNING - CARE MANAGEMENT     Discharge to post-acute care or home with appropriate resources Progressing        INFECTION - ADULT     Absence or prevention of progression during hospitalization Progressing        PAIN - ADULT     Verbalizes/displays adequate comfort level or baseline comfort level Progressing        Potential for Falls     Patient will remain free of falls Progressing        SAFETY ADULT     Maintain or return to baseline ADL function Progressing     Maintain or return mobility status to optimal level Progressing        SKIN/TISSUE INTEGRITY - ADULT     Skin integrity remains intact Progressing     Incision(s), wounds(s) or drain site(s) healing without S/S of infection Progressing

## 2018-12-03 NOTE — UTILIZATION REVIEW
Continued Stay Review  Date: 12/3/18  Vital Signs: /80 (BP Location: Left arm)   Pulse 64   Temp 98 9 °F (37 2 °C) (Oral)   Resp 20   Ht 4' 11" (1 499 m)   Wt 71 7 kg (158 lb 1 1 oz)   SpO2 94%   BMI 31 93 kg/m²   Medications:   Scheduled Meds:   Current Facility-Administered Medications:  amLODIPine 5 mg Oral Daily Johnna Revankar, DO    aspirin 325 mg Oral Daily Shanthi Moreira MD    atorvastatin 40 mg Oral Daily Johnna Revankar, DO    busPIRone 10 mg Oral TID PRN Johnna Revankar, DO    cefepime 2,000 mg Intravenous Q12H Johnna Revankar, DO Last Rate: 2,000 mg (12/03/18 0102)   lisinopril 20 mg Oral Daily Johnna Revankar, DO    morphine injection 2 mg Intravenous Q4H PRN Johnna Revankar, DO    nicotine 1 patch Transdermal Daily Johnna Revankar, DO    ondansetron 4 mg Intravenous Q6H PRN Johnna Revankar, DO    oxyCODONE 5 mg Oral Q6H PRN Johnna Revankar, DO    vancomycin 15 mg/kg Intravenous Q12H Johnna Revankar, DO Last Rate: 1,000 mg (12/03/18 0350)   venlafaxine 100 mg Oral Daily Johnna Revankar, DO    venlafaxine 75 mg Oral Daily Johnna Revankar, DO    Continuous Infusions:    PRN Meds: busPIRone    morphine injection    ondansetron    oxyCODONE  Abnormal Labs/Diagnostic Results:     Age/Sex: 47 y o  female   Assessment/Plan:   POD#2 I&D R HAND & INDEX FINGER RAY AMPUTATION  FLAPS/INCISION C,D,&I  PERSISTENT TENDERNESS & SWELLING NOTED  DOUBLE IVABT THERAPY CONTINUES  Discharge Plan: TBD  [address] Utilization Review Department  Phone: [phone]; Fax [phone]  [email or web address]  ATTENTION: Please call with any questions or concerns to [phone]  and carefully listen to the prompts so that you are directed to the right person  Send all requests for admission clinical reviews, approved or denied determinations and any other requests to fax [phone]   All voicemails are confidential

## 2018-12-03 NOTE — PHYSICAL THERAPY NOTE
PT EVALUATION     12/03/18 0900   Note Type   Note type Eval/Treat   Pain Assessment   Pain Assessment 0-10   Pain Score 5   Pain Type Surgical pain;Acute pain   Pain Location Hand   Pain Orientation Right   Home Living   Type of Home Mobile home  (3 RAMIREZ with rail on right on ascent)   Home Layout One level   Bathroom Equipment Tub transfer bench   Home Equipment Quad cane  (rollator;walking stick;RW)   Prior Function   Level of [place] Independent with ADLs and functional mobility; Needs assistance with IADLs  (pt amb w/out AD indside;uses walking stick/rollator outside)   Lives With (sister in law)   Receives Help From Family   ADL Assistance Independent   IADLs Needs assistance   Restrictions/Precautions   Weight Bearing Precautions Per Order (per Dr Nasim Rodriguez note 12/2/18, WBAT RUE)   Other Precautions Pain; Fall Risk  (s/p surgery right hand)   General   Additional Pertinent History Pt adm with with right finger abscess, osteomyelitis and tensosynovitis  Pt is s/p debridement hand/finger with ray amputation right index finger on 12/1/18  Family/Caregiver Present No   Cognition   Overall Cognitive Status WFL   Arousal/Participation Cooperative   Orientation Level Oriented X4   Following Commands Follows all commands and directions without difficulty   RLE Assessment   RLE Assessment WFL  (4- to 4/5)   LLE Assessment   LLE Assessment WFL  (4- to 4/5)   Bed Mobility   Supine to Sit 7  Independent   Transfers   Sit to Stand 4  Minimal assistance   Additional items Assist x 1;Verbal cues   Stand to Sit 4  Minimal assistance   Additional items Assist x 1;Verbal cues   Ambulation/Elevation   Gait pattern Wide JUAN; Short stride; Improper Weight shift;Decreased foot clearance; Inconsistent dyan   Gait Assistance 4  Minimal assist   Additional items Assist x 1;Verbal cues; Tactile cues   Assistive Device None   Distance 80 feet with change in direction   Balance   Static Sitting Fair   Static Standing Fair Dynamic Standing Fair -   Ambulatory Poor +   Activity Tolerance   Activity Tolerance Patient limited by fatigue   Assessment   Prognosis Good   Problem List Decreased strength;Decreased range of motion;Decreased endurance; Impaired balance;Decreased mobility; Decreased coordination;Decreased safety awareness;Pain   Assessment Patient seen for Physical Therapy evaluation  Patient admitted with Abscess of right index finger  Comorbidities affecting patient's physical performance include: history of stroke, HTN  Personal factors affecting patient at time of initial evaluation include: lives in one story house, ambulating with assistive device, stairs to enter home, inability to perform dynamic tasks in community, anxiety, depression and inability to perform IADLS   Prior to admission, patient was independent with functional mobility with walking stick or rollator, independent with functional mobility without assistive device, independent with ADLS, requiring assist for IADLS, living with sister in law in a one level home with 3 steps to enter and ambulating household distance  Please find objective findings from Physical Therapy assessment regarding body systems outlined above with impairments and limitations including impaired balance, decreased endurance, impaired coordination, gait deviations, pain, decreased activity tolerance, decreased functional mobility tolerance, decreased safety awareness and fall risk  The Barthel Index was used as a functional outcome tool presenting with a score of 45 today indicating marked limitations of functional mobility and ADLS  Patient's clinical presentation is currently unstable/unpredictable as seen in patient's presentation of vital sign response, changing level of pain, increased fall risk, new onset of impairment of functional mobility, decreased endurance and new onset of weakness   Pt would benefit from continued Physical Therapy treatment to address deficits as defined above and maximize level of functional mobility  As demonstrated by objective findings, the assigned level of complexity for this evaluation is high  Goals   Patient Goals go home   STG Expiration Date 12/10/18   Short Term Goal #1 bed mob - I; trans - S   Short Term Goal #2 pt will amb with LRD x 100 feet so pt can navigate home distances and short community distances; balance w AD - F/F+ for safe mobility and to decrease fall risk; up/down 3 steps with rail and S so pt can enter/exit her home   LTG Expiration Date 12/17/18   Long Term Goal #1 trans - I; pt will amb with LRD x 300 feet - I so pt can navigate full home/community distances as previous   Long Term Goal #2 balance with AD - F+/G for safe mobility and to decrease fall risk; up/down 3 steps with rail - I so pt can enter/exit her home   Treatment Day 1   Plan   Treatment/Interventions ADL retraining;Functional transfer training;LE strengthening/ROM; Elevations; Therapeutic exercise; Endurance training;Patient/family training;Equipment eval/education; Bed mobility;Gait training; Compensatory technique education   PT Frequency 5x/wk   Recommendation   Recommendation Home with family support   Equipment Recommended (pt has all DME needs)   Barthel Index   Feeding 5   Bathing 0   Grooming Score 0   Dressing Score 5   Bladder Score 10   Bowels Score 10   Toilet Use Score 5   Transfers (Bed/Chair) Score 10   Mobility (Level Surface) Score 0   Stairs Score 0   Barthel Index Score 39   Licensure   [place] License Number  Jenny [place], [place] [id number]         Time In:0900  Time [id number]  Total Time: 12 mins      S:  "The pain in my hand isn't too bad "  O:  Pt trans sit to stand with min A  Pt amb with pt's walking stick x 80 feet with min A/S with change in direction  Pt sat OOB in chair with all needs in reach, and PT assisted pt with bkfst set up  A:  Pt's gait improved with pt's walking stick vs no AD  However, walking stick is too high   Per MD note, pt is allowed WBAT RUE so pt may benefit from amb with RW to improve her gait and balance  P:  Cont per PT POC  DCP - home with family support      Oneil Hathaway [place]   [phone]

## 2018-12-03 NOTE — NURSING NOTE
DISCHARGE INSTRUCTIONS REVIEWED WITH PATIENT  PATIENT VERBALIZED UNDERSTANDING  HOME MEDICATIONS RETRIEVED FROM PHARMACY  ALL PERSONAL BELONGINGS TAKEN WITH PATIENT  IV ACCESS REMOVED PRIOR TO DISCHARGE  PNEUMO GIVEN PRIOR TO DISCHARGE  PREVIOUSLY VACCINATED WITH FLU  EXTRA SUPPLIES FOR HAND GIVEN TO PATIENT FOR HOME

## 2018-12-04 LAB
BACTERIA SPEC ANAEROBE CULT: ABNORMAL
BACTERIA SPEC ANAEROBE CULT: ABNORMAL
BACTERIA WND AEROBE CULT: NO GROWTH
GRAM STN SPEC: NORMAL

## 2018-12-04 NOTE — DISCHARGE SUMMARY
Discharge- Phuong Talbot 1965, 47 y o  female MRN: [id number]    Unit/Bed#: [address] [place] [address] Encounter: [id number]    Primary Care Provider: Maria Esther Ma MD   Date and time admitted to hospital: 11/30/2018  9:08 PM        Osteomyelitis of finger of right hand [facility])   Assessment & Plan    · MRI showed findings concerning for early osteomyelitis of right 2nd middle phalanx  · IV antibiotics as noted above  ·  ID and hand surgery consult  · S/p right index ray amputation  · Plan as noted      * Abscess of right index finger   Assessment & Plan    · MRI shows abscess palmar to 2nd proximal interphalangeal joint and other developing abscesses along with tenosynovitis of the 2nd digit flexors  · Consulted hand surgery and ID  · S/p debridement and digit amputation on 11/30  · Cont IV vancomycin and cefepime, received 4 days  · Discharged home on p o  Keflex to complete a 7 day course of antibiotic  · WBAT, OT for finger/hand ROM  · On discharge advised to keep dressing intact until follow-up with he answered       Essential hypertension   Assessment & Plan    Continue Norvasc and lisinopril and monitor blood pressures     H/O: stroke   Assessment & Plan    · Reports history of CVA in January 2018 with some mild residual left arm and leg weakness  · No residual weakness noted now  · Continue statin  ASA           Discharging Physician / Practitioner: Dallas Chin MD  PCP: Maria Esther Ma MD  Admission Date: 11/30/2018  Discharge Date: 12/03/18    Reason for Admission: No chief complaint on file          Resolved Problems  Date Reviewed: 12/1/2018    None          Consultations During Hospital Stay:  Brenda [place] TO ORTHOPEDIC SURGERY  IP CONSULT TO INFECTIOUS DISEASES    Procedures Performed:     · Right index finger ray amputation    Significant Findings / Test Results:         Results from last 7 days  Lab Units 12/03/18  0649 11/30/18  1204   WBC Thousand/uL 8 17 9 11   HEMOGLOBIN g/dL 12 7 12 4   PLATELETS Thousands/uL 408* 341       Results from last 7 days  Lab Units 12/03/18  0649 12/02/18  0512 11/30/18  1204   SODIUM mmol/L 141 137 139   POTASSIUM mmol/L 4 3 4 1 3 7   CHLORIDE mmol/L 104 104 103   CO2 mmol/L 27 23 26   BUN mg/dL 15 12 16   CREATININE mg/dL 0 77 0 64 0 77   CALCIUM mg/dL 8 9 8 6 8 5   TOTAL BILIRUBIN mg/dL  --   --  0 20   ALK PHOS U/L  --   --  95   ALT U/L  --   --  71   AST U/L  --   --  27       Results from last 7 days  Lab Units 11/30/18  1204   INR  1 01         No results found for: HGBA1C        Results from last 7 days  Lab Units 11/30/18  1204   LACTIC ACID mmol/L 0 9     Blood Culture:   Lab Results   Component Value Date    BLOODCX No Growth at 72 hrs  11/30/2018    BLOODCX No Growth at 72 hrs  11/30/2018     Urine Culture: No results found for: URINECX  Sputum Culture: No components found for: SPUTUMCX  Wound Culture:   Lab Results   Component Value Date    WOUNDCULT No growth 12/01/2018        XR finger right second digit-index   Final Result by Ryan Gong MD (12/01 1743)      Fluoroscopic guidance provided for surgical procedure  Please refer to the separate procedure notes for additional details  Workstation performed: [id number]                Incidental Findings:   · none     Test Results Pending at Discharge (will require follow up): · Blood cx- NTD  · Wound cx-NTD     Outpatient Tests Requested:  ·     Complications:  none    Reason for Admission: hand abscess/pain    Hospital Course:     Christa Gillespie is a 47 y o  female patient who was transferred from [facility] due to right index finger abscess, osteomyelitis, Tenosynovitis for evaluation by Hand surgery here  Patient is tentatively scheduled for OR tomorrow  Patient states that about 2 weeks ago she cut her right index finger on a can and was seen at [facility]   She was admitted and received 5 days of IV antibiotics  the patient states that she had blisters incised during her admission    She was discharged on Keflex which she has been compliant with  Patient states that the redness has decreased from her mid forearm to just her index finger now  the index finger; however, is now more swollen and finger tip is curved down  She also reports pain to the index finger  MRI of the right hand done at [facility] the ER showed findings consistent with abscess, early osteomyelitis and Tenosynovitis        Please see above list of diagnoses and related plan for additional information  Condition at Discharge: good       Discharge Day Visit / Exam:     Subjective:  Feels good, ready to go home  Doesn't know if she wants OT but will think about it  Vitals: Blood Pressure: 122/78 (12/03/18 1342)  Pulse: 67 (12/03/18 1342)  Temperature: 99 2 °F (37 3 °C) (12/03/18 1342)  Temp Source: Oral (12/03/18 1342)  Respirations: 20 (12/03/18 1342)  Height: 4' 11" (149 9 cm) (11/30/18 2111)  Weight - Scale: 71 7 kg (158 lb 1 1 oz) (11/30/18 2111)  SpO2: 94 % (12/03/18 1342)  Exam:   Physical Exam  Constitutional: She is oriented to person, place, and time  She appears well-developed  No distress  HENT:   Head: Normocephalic and atraumatic  Cardiovascular: Normal rate, regular rhythm and normal heart sounds  Pulmonary/Chest: Effort normal and breath sounds normal  No respiratory distress  She has no wheezes  She has no rales  Abdominal: Soft  Bowel sounds are normal  She exhibits no distension  There is no tenderness  There is no rebound and no guarding  Musculoskeletal: She exhibits no edema  Neurological: She is alert and oriented to person, place, and time  Skin: Skin is warm and dry  Right hand with ace wrap and dressing in place  Psychiatric: She has a normal mood and affect  Her behavior is normal    Nursing note and vitals reviewed  Discharge instructions/Information to patient and family:   See after visit summary for information provided to patient and family        Provisions for Follow-Up Care:  See after visit summary for information related to follow-up care and any pertinent home health orders  Disposition:     Home with [facility]NA Services (Reminder: Complete face to face encounter)    Planned Readmission: no     Discharge Statement:  I spent >30 minutes discharging the patient  This time was spent on the day of discharge  I had direct contact with the patient on the day of discharge  Greater than 50% of the total time was spent examining patient, answering all patient questions, arranging and discussing plan of care with patient as well as directly providing post-discharge instructions  Additional time then spent on discharge activities  Discharge Medications:  See after visit summary for reconciled discharge medications provided to patient and family        ** Please Note: This note has been constructed using a voice recognition system **

## 2018-12-05 LAB
BACTERIA BLD CULT: NORMAL
BACTERIA BLD CULT: NORMAL

## 2018-12-05 NOTE — UTILIZATION REVIEW
Auth:   [id number]    Notification of Discharge  This is a Notification of Discharge from our facility [address]  Please be advised that this patient has been discharge from our facility  Below you will find the admission and discharge date and time including the patients disposition  PRESENTATION DATE: 11/30/2018  9:08 PM  IP ADMISSION DATE: 11/30/18 2108  DISCHARGE DATE: 12/3/2018  5:44 PM  DISPOSITION: [id number] Utilization Review Department  Phone: [phone]; Fax [phone]  ATTENTION: Please call with any questions or concerns to [phone]  and carefully listen to the prompts so that you are directed to the right person  Send all requests for admission clinical reviews, approved or denied determinations and any other requests to fax [phone]   All voicemails are confidential

## 2018-12-14 ENCOUNTER — OFFICE VISIT (OUTPATIENT)
Dept: OBGYN CLINIC | Facility: CLINIC | Age: 53
End: 2018-12-14

## 2018-12-14 VITALS
WEIGHT: 158 LBS | HEIGHT: 59 IN | BODY MASS INDEX: 31.85 KG/M2 | DIASTOLIC BLOOD PRESSURE: 80 MMHG | HEART RATE: 89 BPM | SYSTOLIC BLOOD PRESSURE: 134 MMHG

## 2018-12-14 DIAGNOSIS — M86.9 OSTEOMYELITIS OF FINGER OF RIGHT HAND (HCC): Primary | ICD-10-CM

## 2018-12-14 DIAGNOSIS — S68.119D AMPUTATION OF FINGER, SUBSEQUENT ENCOUNTER: ICD-10-CM

## 2018-12-14 PROCEDURE — 99024 POSTOP FOLLOW-UP VISIT: CPT | Performed by: ORTHOPAEDIC SURGERY

## 2018-12-14 RX ORDER — VENLAFAXINE HYDROCHLORIDE 150 MG/1
150 CAPSULE, EXTENDED RELEASE ORAL DAILY
Refills: 4 | COMMUNITY
Start: 2018-11-27 | End: 2020-12-04 | Stop reason: SDUPTHER

## 2018-12-14 RX ORDER — VENLAFAXINE HYDROCHLORIDE 75 MG/1
75 CAPSULE, EXTENDED RELEASE ORAL DAILY
Refills: 5 | COMMUNITY
Start: 2018-11-27 | End: 2020-04-01

## 2018-12-14 NOTE — PROGRESS NOTES
Assessment/Plan:  1  Osteomyelitis of finger of right hand ([address] )     2  Amputation of finger, subsequent encounter         Scribe Attestation    I,:   Charu Queen MA am acting as a scribe while in the presence of the attending physician :        I,:   Wisam Cardona DO personally performed the services described in this documentation    as scribed in my presence :              Violeta Clay is doing well postoperatively  She is 2 weeks s/p ray amputation of right index finger  Her incision is clean dry and intact without any signs of infection  She was instructed she may shower but not to soak the incision for an addition week  She was instructed to keep the wound clean over the next week  She has no restriction at this time and may return to her ADLs as tolerated  She will follow up in 2 weeks for repeat evaluation  Subjective:   Genesis Longoria is a 47 y o  female who presents to the office 2 weeks s/p debridement and ray amputation of right index finger performed on 12/1/18  She presents to the office today with the splint removed which she states her PCP removed on Tuesday  She states she has been compliant with anti-biotic use  Review of Systems   Constitutional: Negative for chills and fever  HENT: Positive for sore throat  Negative for drooling and sneezing  Eyes: Negative for redness  Respiratory: Negative for cough and wheezing  Gastrointestinal: Positive for nausea  Negative for vomiting  Musculoskeletal: Positive for arthralgias  Negative for joint swelling and myalgias  Skin: Positive for rash  Neurological: Negative for weakness and numbness  Psychiatric/Behavioral: Negative for behavioral problems  The patient is nervous/anxious            Past Medical History:   Diagnosis Date    Hypertension     Stroke [facility])        Past Surgical History:   Procedure Laterality Date    CHOLECYSTECTOMY      HAND DEBRIDEMENT Right 12/1/2018    Procedure: DEBRIDEMENT HAND/FINGER [facility]) WITH RAY AMPUTATION OF RIGHT INDEX FINGER;  Surgeon: Akira Ibanez DO;  Location: [address] [place] [address];  Service: Orthopedics       History reviewed  No pertinent family history  Social History     Occupational History    Not on file  Social History Main Topics    Smoking status: Current Some Day Smoker    Smokeless tobacco: Never Used    Alcohol use Yes      Comment: occasional    Drug use: No    Sexual activity: Not on file         Current Outpatient Prescriptions:     amLODIPine (NORVASC) 5 mg tablet, Take 5 mg by mouth daily, Disp: , Rfl:     aspirin (ECOTRIN) 325 mg EC tablet, Take 325 mg by mouth daily, Disp: , Rfl:     atorvastatin (LIPITOR) 40 mg tablet, Take 40 mg by mouth daily, Disp: , Rfl:     busPIRone (BUSPAR) 10 mg tablet, Take 10 mg by mouth 3 (three) times a day as needed  , Disp: , Rfl:     lisinopril (ZESTRIL) 20 mg tablet, Take 20 mg by mouth daily, Disp: , Rfl:     nicotine (NICODERM CQ) 14 mg/24hr TD 24 hr patch, Place 1 patch on the skin daily, Disp: 28 patch, Rfl: 0    venlafaxine (EFFEXOR-XR) 150 mg 24 hr capsule, Take 150 mg by mouth daily, Disp: , Rfl: 4    venlafaxine (EFFEXOR-XR) 75 mg 24 hr capsule, Take 75 mg by mouth daily, Disp: , Rfl: 5    Allergies   Allergen Reactions    Bactrim [Sulfamethoxazole-Trimethoprim]        Objective:  Vitals:    12/14/18 1442   BP: 134/80   Pulse: 89       Ortho Exam     Right Hand    Incision healed no signs of infection  Sensation intact radial, median, and ulnar nerve   Able to make full composite fist  Compartments soft   No overlap of fingers    Physical Exam   Constitutional: She is oriented to person, place, and time  She appears well-developed and well-nourished  HENT:   Head: Normocephalic and atraumatic  Eyes: Conjunctivae are normal  Right eye exhibits no discharge  Left eye exhibits no discharge  Neck: Normal range of motion  Neck supple  Cardiovascular: Normal rate and intact distal pulses      Pulmonary/Chest: Effort normal  No respiratory distress  Musculoskeletal:   As noted in HPI   Neurological: She is alert and oriented to person, place, and time  Skin: Skin is warm and dry  Psychiatric: She has a normal mood and affect   Her behavior is normal  Judgment and thought content normal

## 2018-12-28 ENCOUNTER — OFFICE VISIT (OUTPATIENT)
Dept: OBGYN CLINIC | Facility: CLINIC | Age: 53
End: 2018-12-28

## 2018-12-28 ENCOUNTER — APPOINTMENT (OUTPATIENT)
Dept: RADIOLOGY | Facility: CLINIC | Age: 53
End: 2018-12-28
Payer: MEDICARE

## 2018-12-28 VITALS
WEIGHT: 158 LBS | HEART RATE: 73 BPM | DIASTOLIC BLOOD PRESSURE: 85 MMHG | BODY MASS INDEX: 31.85 KG/M2 | SYSTOLIC BLOOD PRESSURE: 146 MMHG | HEIGHT: 59 IN

## 2018-12-28 DIAGNOSIS — S68.119D AMPUTATION OF FINGER, SUBSEQUENT ENCOUNTER: Primary | ICD-10-CM

## 2018-12-28 DIAGNOSIS — S68.119D AMPUTATION OF FINGER, SUBSEQUENT ENCOUNTER: ICD-10-CM

## 2018-12-28 PROCEDURE — 73120 X-RAY EXAM OF HAND: CPT

## 2018-12-28 PROCEDURE — 99024 POSTOP FOLLOW-UP VISIT: CPT | Performed by: ORTHOPAEDIC SURGERY

## 2018-12-28 NOTE — PROGRESS NOTES
Assessment/Plan:  1  Amputation of finger, subsequent encounter  XR hand 2 vw right       Scribe Attestation    I,:   Charu Queen MA am acting as a scribe while in the presence of the attending physician :        I,:   Shasta Eisenmenger, DO personally performed the services described in this documentation    as scribed in my presence :              Lorenzo is doing well postoperatively  She is aware she has no restriction at this time  Her incision is well healed  She may follow up with me as needed  Subjective:   Roya Stark is a 47 y o  female who presents to the office 4 weeks s/p ray amputation of right index finger performed on 12/1/18  She states she is doing well postoperatively  She states she has been compliant with anti-biotic use  She states she has been performing her ADLs without any complications  Review of Systems   Constitutional: Negative for chills and fever  HENT: Negative for drooling and sneezing  Eyes: Negative for redness  Respiratory: Negative for cough and wheezing  Gastrointestinal: Negative for nausea and vomiting  Musculoskeletal: Negative for arthralgias, joint swelling and myalgias  Neurological: Negative for weakness and numbness  Psychiatric/Behavioral: Negative for behavioral problems  The patient is not nervous/anxious  Past Medical History:   Diagnosis Date    Hypertension     Stroke [facility])        Past Surgical History:   Procedure Laterality Date    CHOLECYSTECTOMY      HAND DEBRIDEMENT Right 12/1/2018    Procedure: DEBRIDEMENT HAND/FINGER [facility] OUT) WITH RAY AMPUTATION OF RIGHT INDEX FINGER;  Surgeon: Shasta Eisenmenger, DO;  Location: [address] [place] [address];  Service: Orthopedics       History reviewed  No pertinent family history  Social History     Occupational History    Not on file  Social History Main Topics    Smoking status: Current Some Day Smoker    Smokeless tobacco: Never Used    Alcohol use Yes      Comment: occasional    Drug use:  No  Sexual activity: Not on file         Current Outpatient Prescriptions:     amLODIPine (NORVASC) 5 mg tablet, Take 5 mg by mouth daily, Disp: , Rfl:     aspirin (ECOTRIN) 325 mg EC tablet, Take 325 mg by mouth daily, Disp: , Rfl:     atorvastatin (LIPITOR) 40 mg tablet, Take 40 mg by mouth daily, Disp: , Rfl:     busPIRone (BUSPAR) 10 mg tablet, Take 10 mg by mouth 3 (three) times a day as needed  , Disp: , Rfl:     lisinopril (ZESTRIL) 20 mg tablet, Take 20 mg by mouth daily, Disp: , Rfl:     nicotine (NICODERM CQ) 14 mg/24hr TD 24 hr patch, Place 1 patch on the skin daily, Disp: 28 patch, Rfl: 0    venlafaxine (EFFEXOR-XR) 150 mg 24 hr capsule, Take 150 mg by mouth daily, Disp: , Rfl: 4    venlafaxine (EFFEXOR-XR) 75 mg 24 hr capsule, Take 75 mg by mouth daily, Disp: , Rfl: 5    Allergies   Allergen Reactions    Bactrim [Sulfamethoxazole-Trimethoprim]        Objective: There were no vitals filed for this visit  Ortho Exam     Right Hand     Incision well healed  Full ringer ROM   Sensation intact median, radial, and ulnar nerve  Compartments soft      Physical Exam   Constitutional: She is oriented to person, place, and time  She appears well-developed and well-nourished  HENT:   Head: Normocephalic and atraumatic  Eyes: Conjunctivae are normal  Right eye exhibits no discharge  Left eye exhibits no discharge  Neck: Normal range of motion  Neck supple  Cardiovascular: Normal rate and intact distal pulses  Pulmonary/Chest: Effort normal  No respiratory distress  Neurological: She is alert and oriented to person, place, and time  Skin: Skin is warm and dry  Psychiatric: She has a normal mood and affect  Her behavior is normal  Judgment and thought content normal        I have personally reviewed pertinent films in PACS and my interpretation is as follows:X-ray right hand performed in the office today demonstrates successful ray amputation of the right index finger

## 2019-01-15 ENCOUNTER — TRANSCRIBE ORDERS (OUTPATIENT)
Dept: ADMINISTRATIVE | Facility: HOSPITAL | Age: 54
End: 2019-01-15

## 2019-01-15 DIAGNOSIS — R53.83 OTHER FATIGUE: ICD-10-CM

## 2019-01-15 DIAGNOSIS — E78.5 HYPERLIPIDEMIA, UNSPECIFIED HYPERLIPIDEMIA TYPE: Primary | ICD-10-CM

## 2020-04-01 ENCOUNTER — OFFICE VISIT (OUTPATIENT)
Dept: FAMILY MEDICINE CLINIC | Facility: CLINIC | Age: 55
End: 2020-04-01
Payer: MEDICARE

## 2020-04-01 VITALS
HEART RATE: 63 BPM | HEIGHT: 59 IN | RESPIRATION RATE: 18 BRPM | TEMPERATURE: 97.7 F | OXYGEN SATURATION: 97 % | WEIGHT: 166.4 LBS | SYSTOLIC BLOOD PRESSURE: 130 MMHG | BODY MASS INDEX: 33.55 KG/M2 | DIASTOLIC BLOOD PRESSURE: 76 MMHG

## 2020-04-01 DIAGNOSIS — Z86.73 H/O: STROKE: ICD-10-CM

## 2020-04-01 DIAGNOSIS — I10 ESSENTIAL HYPERTENSION: Primary | ICD-10-CM

## 2020-04-01 DIAGNOSIS — F32.A ANXIETY AND DEPRESSION: ICD-10-CM

## 2020-04-01 DIAGNOSIS — F41.9 ANXIETY AND DEPRESSION: ICD-10-CM

## 2020-04-01 DIAGNOSIS — R53.83 FATIGUE, UNSPECIFIED TYPE: ICD-10-CM

## 2020-04-01 DIAGNOSIS — Z72.0 TOBACCO USE: ICD-10-CM

## 2020-04-01 DIAGNOSIS — Z12.39 SCREENING FOR MALIGNANT NEOPLASM OF BREAST: ICD-10-CM

## 2020-04-01 PROBLEM — E78.2 MIXED HYPERLIPIDEMIA: Status: ACTIVE | Noted: 2020-04-01

## 2020-04-01 PROCEDURE — 99214 OFFICE O/P EST MOD 30 MIN: CPT | Performed by: FAMILY MEDICINE

## 2020-04-01 RX ORDER — AMLODIPINE BESYLATE 5 MG/1
5 TABLET ORAL DAILY
Qty: 30 TABLET | Refills: 0 | Status: CANCELLED | OUTPATIENT
Start: 2020-04-01

## 2020-06-24 DIAGNOSIS — E78.5 HYPERLIPIDEMIA, UNSPECIFIED: ICD-10-CM

## 2020-06-24 DIAGNOSIS — I10 ESSENTIAL (PRIMARY) HYPERTENSION: ICD-10-CM

## 2020-06-24 RX ORDER — AMLODIPINE BESYLATE 5 MG/1
TABLET ORAL
Qty: 90 TABLET | Refills: 1 | Status: SHIPPED | OUTPATIENT
Start: 2020-06-24 | End: 2020-09-17 | Stop reason: SDUPTHER

## 2020-06-24 RX ORDER — ATORVASTATIN CALCIUM 40 MG/1
TABLET, FILM COATED ORAL
Qty: 90 TABLET | Refills: 1 | Status: SHIPPED | OUTPATIENT
Start: 2020-06-24 | End: 2020-09-17 | Stop reason: SDUPTHER

## 2020-07-01 ENCOUNTER — OFFICE VISIT (OUTPATIENT)
Dept: FAMILY MEDICINE CLINIC | Facility: CLINIC | Age: 55
End: 2020-07-01
Payer: COMMERCIAL

## 2020-07-01 VITALS
HEIGHT: 59 IN | OXYGEN SATURATION: 97 % | TEMPERATURE: 97.1 F | DIASTOLIC BLOOD PRESSURE: 82 MMHG | WEIGHT: 161 LBS | SYSTOLIC BLOOD PRESSURE: 130 MMHG | RESPIRATION RATE: 20 BRPM | BODY MASS INDEX: 32.46 KG/M2 | HEART RATE: 70 BPM

## 2020-07-01 DIAGNOSIS — F32.A ANXIETY AND DEPRESSION: ICD-10-CM

## 2020-07-01 DIAGNOSIS — Z86.73 H/O: STROKE: ICD-10-CM

## 2020-07-01 DIAGNOSIS — F41.9 ANXIETY AND DEPRESSION: ICD-10-CM

## 2020-07-01 DIAGNOSIS — Z72.0 TOBACCO USE: ICD-10-CM

## 2020-07-01 DIAGNOSIS — E78.2 MIXED HYPERLIPIDEMIA: ICD-10-CM

## 2020-07-01 DIAGNOSIS — I10 ESSENTIAL HYPERTENSION: Primary | ICD-10-CM

## 2020-07-01 PROCEDURE — 4004F PT TOBACCO SCREEN RCVD TLK: CPT | Performed by: FAMILY MEDICINE

## 2020-07-01 PROCEDURE — 3079F DIAST BP 80-89 MM HG: CPT | Performed by: FAMILY MEDICINE

## 2020-07-01 PROCEDURE — 99214 OFFICE O/P EST MOD 30 MIN: CPT | Performed by: FAMILY MEDICINE

## 2020-07-01 PROCEDURE — 3008F BODY MASS INDEX DOCD: CPT | Performed by: FAMILY MEDICINE

## 2020-07-01 PROCEDURE — 3075F SYST BP GE 130 - 139MM HG: CPT | Performed by: FAMILY MEDICINE

## 2020-07-01 RX ORDER — HYDROXYZINE HYDROCHLORIDE 25 MG/1
25 TABLET, FILM COATED ORAL EVERY 6 HOURS PRN
Qty: 90 TABLET | Refills: 0 | Status: SHIPPED | OUTPATIENT
Start: 2020-07-01 | End: 2020-07-18

## 2020-07-01 RX ORDER — LISINOPRIL 20 MG/1
20 TABLET ORAL DAILY
Qty: 90 TABLET | Refills: 1 | Status: SHIPPED | OUTPATIENT
Start: 2020-07-01 | End: 2020-12-04 | Stop reason: SDUPTHER

## 2020-07-01 NOTE — PROGRESS NOTES
Tobacco Cessation Counseling: Tobacco cessation counseling was provided  The patient is sincerely urged to quit consumption of tobacco  She is not ready to quit tobacco    Assessment/Plan:         Problem List Items Addressed This Visit     None            Subjective:      Patient ID: Kareem Leslie is a 48 y o  female  Pt here for  Checkup on CVA, HTN, tobacco abuse and depression  p thas  Been having anxiety and would like something prn  The following portions of the patient's history were reviewed and updated as appropriate:   She has a past medical history of Hypertension and Stroke ([facility] [address] ) (01/2019)  ,  does not have any pertinent problems on file  ,   has a past surgical history that includes Cholecystectomy and Hand Debridement (Right, 12/1/2018)  ,  family history includes Diabetes in her maternal grandmother  ,   reports that she has been smoking cigarettes  She has a 10 00 pack-year smoking history  She has never used smokeless tobacco  She reports that she drank alcohol  She reports that she does not use drugs  ,  is allergic to bactrim [sulfamethoxazole-trimethoprim]     Current Outpatient Medications   Medication Sig Dispense Refill    amLODIPine (NORVASC) 5 mg tablet TAKE 1 TABLET BY MOUTH EVERY DAY 90 tablet 1    aspirin (ECOTRIN) 325 mg EC tablet Take 325 mg by mouth daily      atorvastatin (LIPITOR) 40 mg tablet TAKE 1 TABLET BY MOUTH EVERY DAY 90 tablet 1    lisinopril (ZESTRIL) 20 mg tablet Take 20 mg by mouth daily      venlafaxine (EFFEXOR-XR) 150 mg 24 hr capsule Take 150 mg by mouth daily  4     No current facility-administered medications for this visit  Review of Systems   Constitutional: Negative for activity change, appetite change, fatigue and fever  HENT: Negative for congestion, ear pain, postnasal drip, rhinorrhea, sinus pressure, sinus pain, sneezing and sore throat  Eyes: Negative for pain and redness     Respiratory: Negative for apnea, cough, chest tightness, shortness of breath and wheezing  Cardiovascular: Negative for chest pain, palpitations and leg swelling  Gastrointestinal: Negative for abdominal pain, constipation, diarrhea, nausea and vomiting  Endocrine: Negative for cold intolerance and heat intolerance  Genitourinary: Negative for difficulty urinating, dysuria, frequency, hematuria and urgency  Musculoskeletal: Negative for arthralgias, back pain, gait problem and myalgias  Skin: Negative for rash  Neurological: Negative for dizziness, speech difficulty, weakness, numbness and headaches  Hematological: Does not bruise/bleed easily  Psychiatric/Behavioral: Negative for agitation, confusion and hallucinations  Objective:  Vitals:    07/01/20 1011   BP: 130/82   BP Location: Left arm   Patient Position: Sitting   Cuff Size: Standard   Pulse: 70   Resp: 20   Temp: (!) 97 1 °F (36 2 °C)   TempSrc: Tympanic   SpO2: 97%   Weight: 73 kg (161 lb)   Height: 4' 11" (1 499 m)     Body mass index is 32 52 kg/m²  Physical Exam   Constitutional: She is oriented to person, place, and time  She appears well-developed and well-nourished  HENT:   Head: Normocephalic and atraumatic  Nose: Nose normal    Eyes: Pupils are equal, round, and reactive to light  Conjunctivae and EOM are normal  No scleral icterus  Neck: Normal range of motion  Neck supple  No thyromegaly present  Pulmonary/Chest: Effort normal and breath sounds normal  She has no wheezes  Abdominal: Soft  Bowel sounds are normal  She exhibits no distension  There is no tenderness  There is no rebound and no guarding  Musculoskeletal: Normal range of motion  She exhibits no edema, tenderness or deformity  Neurological: She is alert and oriented to person, place, and time  A sensory deficit is present  Coordination abnormal    Gait slow  No cane today  Skin: Skin is warm and dry  No rash noted  No erythema  Psychiatric: She has a normal mood and affect   Her behavior is normal  Judgment and thought content normal    Nursing note and vitals reviewed

## 2020-07-17 DIAGNOSIS — F32.A ANXIETY AND DEPRESSION: ICD-10-CM

## 2020-07-17 DIAGNOSIS — F41.9 ANXIETY AND DEPRESSION: ICD-10-CM

## 2020-07-18 RX ORDER — HYDROXYZINE HYDROCHLORIDE 25 MG/1
25 TABLET, FILM COATED ORAL EVERY 6 HOURS PRN
Qty: 90 TABLET | Refills: 0 | Status: SHIPPED | OUTPATIENT
Start: 2020-07-18 | End: 2020-09-17 | Stop reason: SDUPTHER

## 2020-09-03 ENCOUNTER — OFFICE VISIT (OUTPATIENT)
Dept: FAMILY MEDICINE CLINIC | Facility: CLINIC | Age: 55
End: 2020-09-03
Payer: COMMERCIAL

## 2020-09-03 VITALS
SYSTOLIC BLOOD PRESSURE: 122 MMHG | BODY MASS INDEX: 32.66 KG/M2 | TEMPERATURE: 97.6 F | WEIGHT: 162 LBS | RESPIRATION RATE: 16 BRPM | DIASTOLIC BLOOD PRESSURE: 80 MMHG | HEIGHT: 59 IN | OXYGEN SATURATION: 98 % | HEART RATE: 75 BPM

## 2020-09-03 DIAGNOSIS — F41.9 ANXIETY AND DEPRESSION: ICD-10-CM

## 2020-09-03 DIAGNOSIS — I10 ESSENTIAL HYPERTENSION: Primary | ICD-10-CM

## 2020-09-03 DIAGNOSIS — F32.A ANXIETY AND DEPRESSION: ICD-10-CM

## 2020-09-03 DIAGNOSIS — Z86.73 H/O: STROKE: ICD-10-CM

## 2020-09-03 DIAGNOSIS — Z72.0 TOBACCO USE: ICD-10-CM

## 2020-09-03 DIAGNOSIS — E78.2 MIXED HYPERLIPIDEMIA: ICD-10-CM

## 2020-09-03 DIAGNOSIS — Z12.39 SCREENING FOR MALIGNANT NEOPLASM OF BREAST: ICD-10-CM

## 2020-09-03 PROCEDURE — 3079F DIAST BP 80-89 MM HG: CPT | Performed by: FAMILY MEDICINE

## 2020-09-03 PROCEDURE — 4004F PT TOBACCO SCREEN RCVD TLK: CPT | Performed by: FAMILY MEDICINE

## 2020-09-03 PROCEDURE — 99214 OFFICE O/P EST MOD 30 MIN: CPT | Performed by: FAMILY MEDICINE

## 2020-09-03 PROCEDURE — 3725F SCREEN DEPRESSION PERFORMED: CPT | Performed by: FAMILY MEDICINE

## 2020-09-03 NOTE — PROGRESS NOTES
Assessment/Plan:      Diagnoses and all orders for this visit:    Essential hypertension    Tobacco use    Mixed hyperlipidemia    H/O: stroke    Anxiety and depression          Subjective:     Patient ID: Leigh Abel is a 48 y o  female  Pt here  For checkup on CVA, , HTN, tobacco abuse and lipids  Pt is now in in  [address] [place] [address] and going weekly  Pt is  OK with that at this point  Review of Systems   Constitutional: Negative for activity change, appetite change, fatigue and fever  HENT: Negative for congestion, ear pain, postnasal drip, rhinorrhea, sinus pressure, sinus pain, sneezing and sore throat  Eyes: Negative for pain and redness  Respiratory: Negative for apnea, cough, chest tightness, shortness of breath and wheezing  Cardiovascular: Negative for chest pain, palpitations and leg swelling  Gastrointestinal: Negative for abdominal pain, constipation, diarrhea, nausea and vomiting  Endocrine: Negative for cold intolerance and heat intolerance  Genitourinary: Negative for difficulty urinating, dysuria, frequency, hematuria and urgency  Musculoskeletal: Negative for arthralgias, back pain, gait problem and myalgias  Skin: Negative for rash  Neurological: Negative for dizziness, speech difficulty, weakness, numbness and headaches  Hematological: Does not bruise/bleed easily  Psychiatric/Behavioral: Negative for agitation, confusion and hallucinations  Objective:     Physical Exam  Vitals signs and nursing note reviewed  Constitutional:       Appearance: She is well-developed  HENT:      Head: Normocephalic and atraumatic  Nose: Nose normal    Eyes:      General: No scleral icterus  Conjunctiva/sclera: Conjunctivae normal       Pupils: Pupils are equal, round, and reactive to light  Neck:      Musculoskeletal: Normal range of motion and neck supple  Thyroid: No thyromegaly  Cardiovascular:      Rate and Rhythm: Normal rate and regular rhythm  Pulmonary:      Effort: Pulmonary effort is normal       Breath sounds: Normal breath sounds  No wheezing  Abdominal:      General: Bowel sounds are normal  There is no distension  Palpations: Abdomen is soft  Tenderness: There is no abdominal tenderness  There is no guarding or rebound  Musculoskeletal: Normal range of motion  General: No tenderness or deformity  Skin:     General: Skin is warm and dry  Findings: No erythema or rash  Neurological:      Mental Status: She is alert and oriented to person, place, and time  Sensory: No sensory deficit  Psychiatric:         Behavior: Behavior normal          Thought Content:  Thought content normal          Judgment: Judgment normal

## 2020-09-17 DIAGNOSIS — E78.5 HYPERLIPIDEMIA, UNSPECIFIED: ICD-10-CM

## 2020-09-17 DIAGNOSIS — I10 ESSENTIAL (PRIMARY) HYPERTENSION: ICD-10-CM

## 2020-09-17 DIAGNOSIS — F32.A ANXIETY AND DEPRESSION: ICD-10-CM

## 2020-09-17 DIAGNOSIS — F41.9 ANXIETY AND DEPRESSION: ICD-10-CM

## 2020-09-17 RX ORDER — ATORVASTATIN CALCIUM 40 MG/1
40 TABLET, FILM COATED ORAL DAILY
Qty: 90 TABLET | Refills: 1 | Status: SHIPPED | OUTPATIENT
Start: 2020-09-17 | End: 2020-12-04 | Stop reason: SDUPTHER

## 2020-09-17 RX ORDER — AMLODIPINE BESYLATE 5 MG/1
5 TABLET ORAL DAILY
Qty: 90 TABLET | Refills: 1 | Status: SHIPPED | OUTPATIENT
Start: 2020-09-17 | End: 2020-12-04 | Stop reason: SDUPTHER

## 2020-09-17 RX ORDER — HYDROXYZINE HYDROCHLORIDE 25 MG/1
25 TABLET, FILM COATED ORAL EVERY 6 HOURS PRN
Qty: 90 TABLET | Refills: 0 | Status: SHIPPED | OUTPATIENT
Start: 2020-09-17 | End: 2020-09-30

## 2020-09-17 NOTE — TELEPHONE ENCOUNTER
Patient called requesting refills on Atorvastatin, Amlodipine, and Hydroxyzine  She said that the Hydroxyzine has been greatly improving her anxiety    Sudhir Love

## 2020-09-29 DIAGNOSIS — F32.A ANXIETY AND DEPRESSION: ICD-10-CM

## 2020-09-29 DIAGNOSIS — F41.9 ANXIETY AND DEPRESSION: ICD-10-CM

## 2020-09-30 RX ORDER — HYDROXYZINE HYDROCHLORIDE 25 MG/1
25 TABLET, FILM COATED ORAL EVERY 6 HOURS PRN
Qty: 90 TABLET | Refills: 0 | Status: SHIPPED | OUTPATIENT
Start: 2020-09-30 | End: 2020-12-04 | Stop reason: SDUPTHER

## 2020-12-04 ENCOUNTER — OFFICE VISIT (OUTPATIENT)
Dept: FAMILY MEDICINE CLINIC | Facility: CLINIC | Age: 55
End: 2020-12-04
Payer: COMMERCIAL

## 2020-12-04 VITALS
RESPIRATION RATE: 12 BRPM | DIASTOLIC BLOOD PRESSURE: 78 MMHG | OXYGEN SATURATION: 98 % | HEIGHT: 59 IN | TEMPERATURE: 97.3 F | WEIGHT: 166 LBS | SYSTOLIC BLOOD PRESSURE: 114 MMHG | HEART RATE: 67 BPM | BODY MASS INDEX: 33.47 KG/M2

## 2020-12-04 DIAGNOSIS — E78.2 MIXED HYPERLIPIDEMIA: ICD-10-CM

## 2020-12-04 DIAGNOSIS — F41.9 ANXIETY AND DEPRESSION: ICD-10-CM

## 2020-12-04 DIAGNOSIS — Z87.891 HISTORY OF TOBACCO ABUSE: ICD-10-CM

## 2020-12-04 DIAGNOSIS — I10 ESSENTIAL HYPERTENSION: Primary | ICD-10-CM

## 2020-12-04 DIAGNOSIS — Z86.73 H/O: STROKE: ICD-10-CM

## 2020-12-04 DIAGNOSIS — Z72.0 TOBACCO USE: ICD-10-CM

## 2020-12-04 DIAGNOSIS — I10 ESSENTIAL (PRIMARY) HYPERTENSION: ICD-10-CM

## 2020-12-04 DIAGNOSIS — Z23 IMMUNIZATION DUE: ICD-10-CM

## 2020-12-04 DIAGNOSIS — F32.A ANXIETY AND DEPRESSION: ICD-10-CM

## 2020-12-04 DIAGNOSIS — F32.A DEPRESSION, UNSPECIFIED DEPRESSION TYPE: ICD-10-CM

## 2020-12-04 DIAGNOSIS — E78.5 HYPERLIPIDEMIA, UNSPECIFIED: ICD-10-CM

## 2020-12-04 PROCEDURE — 3008F BODY MASS INDEX DOCD: CPT | Performed by: FAMILY MEDICINE

## 2020-12-04 PROCEDURE — 99214 OFFICE O/P EST MOD 30 MIN: CPT | Performed by: FAMILY MEDICINE

## 2020-12-04 PROCEDURE — 4004F PT TOBACCO SCREEN RCVD TLK: CPT | Performed by: FAMILY MEDICINE

## 2020-12-04 PROCEDURE — 90682 RIV4 VACC RECOMBINANT DNA IM: CPT | Performed by: FAMILY MEDICINE

## 2020-12-04 PROCEDURE — 3725F SCREEN DEPRESSION PERFORMED: CPT | Performed by: FAMILY MEDICINE

## 2020-12-04 PROCEDURE — 3078F DIAST BP <80 MM HG: CPT | Performed by: FAMILY MEDICINE

## 2020-12-04 PROCEDURE — 90471 IMMUNIZATION ADMIN: CPT | Performed by: FAMILY MEDICINE

## 2020-12-04 PROCEDURE — 3074F SYST BP LT 130 MM HG: CPT | Performed by: FAMILY MEDICINE

## 2020-12-04 RX ORDER — BUPROPION HYDROCHLORIDE 150 MG/1
150 TABLET ORAL EVERY MORNING
Qty: 30 TABLET | Refills: 1 | Status: SHIPPED | OUTPATIENT
Start: 2020-12-04 | End: 2021-01-26

## 2020-12-04 RX ORDER — LISINOPRIL 20 MG/1
20 TABLET ORAL DAILY
Qty: 90 TABLET | Refills: 1 | Status: SHIPPED | OUTPATIENT
Start: 2020-12-04 | End: 2021-07-15

## 2020-12-04 RX ORDER — AMLODIPINE BESYLATE 5 MG/1
5 TABLET ORAL DAILY
Qty: 90 TABLET | Refills: 1 | Status: SHIPPED | OUTPATIENT
Start: 2020-12-04 | End: 2021-06-19

## 2020-12-04 RX ORDER — ATORVASTATIN CALCIUM 40 MG/1
40 TABLET, FILM COATED ORAL DAILY
Qty: 90 TABLET | Refills: 1 | Status: SHIPPED | OUTPATIENT
Start: 2020-12-04 | End: 2021-03-17 | Stop reason: SDUPTHER

## 2020-12-04 RX ORDER — VENLAFAXINE HYDROCHLORIDE 150 MG/1
150 CAPSULE, EXTENDED RELEASE ORAL DAILY
Qty: 90 CAPSULE | Refills: 1 | Status: SHIPPED | OUTPATIENT
Start: 2020-12-04 | End: 2021-07-08 | Stop reason: SDUPTHER

## 2020-12-04 RX ORDER — HYDROXYZINE HYDROCHLORIDE 25 MG/1
25 TABLET, FILM COATED ORAL EVERY 6 HOURS PRN
Qty: 90 TABLET | Refills: 1 | Status: SHIPPED | OUTPATIENT
Start: 2020-12-04 | End: 2021-02-04

## 2020-12-14 ENCOUNTER — APPOINTMENT (OUTPATIENT)
Dept: LAB | Facility: MEDICAL CENTER | Age: 55
End: 2020-12-14
Payer: COMMERCIAL

## 2020-12-14 ENCOUNTER — HOSPITAL ENCOUNTER (OUTPATIENT)
Dept: RADIOLOGY | Facility: MEDICAL CENTER | Age: 55
Discharge: HOME | End: 2020-12-14
Payer: COMMERCIAL

## 2020-12-14 VITALS — BODY MASS INDEX: 33.47 KG/M2 | HEIGHT: 59 IN | WEIGHT: 166 LBS

## 2020-12-14 DIAGNOSIS — E78.2 MIXED HYPERLIPIDEMIA: ICD-10-CM

## 2020-12-14 DIAGNOSIS — Z12.39 SCREENING FOR MALIGNANT NEOPLASM OF BREAST: ICD-10-CM

## 2020-12-14 DIAGNOSIS — Z12.31 VISIT FOR SCREENING MAMMOGRAM: ICD-10-CM

## 2020-12-14 PROCEDURE — 77067 SCR MAMMO BI INCL CAD: CPT

## 2020-12-14 PROCEDURE — 80053 COMPREHEN METABOLIC PANEL: CPT

## 2020-12-14 PROCEDURE — 36415 COLL VENOUS BLD VENIPUNCTURE: CPT

## 2020-12-14 PROCEDURE — 77063 BREAST TOMOSYNTHESIS BI: CPT

## 2020-12-14 PROCEDURE — 80061 LIPID PANEL: CPT

## 2020-12-15 LAB
ALBUMIN SERPL BCP-MCNC: 4 G/DL (ref 3.5–5)
ALP SERPL-CCNC: 115 U/L (ref 46–116)
ALT SERPL W P-5'-P-CCNC: 21 U/L (ref 12–78)
ANION GAP SERPL CALCULATED.3IONS-SCNC: 6 MMOL/L (ref 4–13)
AST SERPL W P-5'-P-CCNC: 12 U/L (ref 5–45)
BILIRUB SERPL-MCNC: 0.35 MG/DL (ref 0.2–1)
BUN SERPL-MCNC: 16 MG/DL (ref 5–25)
CALCIUM SERPL-MCNC: 9.3 MG/DL (ref 8.3–10.1)
CHLORIDE SERPL-SCNC: 108 MMOL/L (ref 100–108)
CHOLEST SERPL-MCNC: 154 MG/DL (ref 50–200)
CO2 SERPL-SCNC: 27 MMOL/L (ref 21–32)
CREAT SERPL-MCNC: 0.87 MG/DL (ref 0.6–1.3)
GFR SERPL CREATININE-BSD FRML MDRD: 76 ML/MIN/1.73SQ M
GLUCOSE P FAST SERPL-MCNC: 69 MG/DL (ref 65–99)
HDLC SERPL-MCNC: 55 MG/DL
LDLC SERPL CALC-MCNC: 75 MG/DL (ref 0–100)
POTASSIUM SERPL-SCNC: 3.9 MMOL/L (ref 3.5–5.3)
PROT SERPL-MCNC: 7.8 G/DL (ref 6.4–8.2)
SODIUM SERPL-SCNC: 141 MMOL/L (ref 136–145)
TRIGL SERPL-MCNC: 122 MG/DL

## 2020-12-29 ENCOUNTER — TELEPHONE (OUTPATIENT)
Dept: FAMILY MEDICINE CLINIC | Facility: CLINIC | Age: 55
End: 2020-12-29

## 2021-01-04 ENCOUNTER — TELEPHONE (OUTPATIENT)
Dept: FAMILY MEDICINE CLINIC | Facility: CLINIC | Age: 56
End: 2021-01-04

## 2021-01-05 NOTE — TELEPHONE ENCOUNTER
If she has symptoms --yes  If  She has  No symptoms, she  Needs to  Go to [facility] or [facility] for  [employer] [facility]

## 2021-01-14 ENCOUNTER — TELEMEDICINE (OUTPATIENT)
Dept: FAMILY MEDICINE CLINIC | Facility: CLINIC | Age: 56
End: 2021-01-14
Payer: COMMERCIAL

## 2021-01-14 DIAGNOSIS — Z12.4 SCREENING FOR CERVICAL CANCER: ICD-10-CM

## 2021-01-14 DIAGNOSIS — I10 ESSENTIAL HYPERTENSION: Primary | ICD-10-CM

## 2021-01-14 DIAGNOSIS — Z72.0 TOBACCO USE: ICD-10-CM

## 2021-01-14 DIAGNOSIS — M86.9 OSTEOMYELITIS OF FINGER OF RIGHT HAND (HCC): ICD-10-CM

## 2021-01-14 DIAGNOSIS — E78.2 MIXED HYPERLIPIDEMIA: ICD-10-CM

## 2021-01-14 DIAGNOSIS — Z12.11 SCREENING FOR COLORECTAL CANCER: ICD-10-CM

## 2021-01-14 DIAGNOSIS — Z12.12 SCREENING FOR COLORECTAL CANCER: ICD-10-CM

## 2021-01-14 DIAGNOSIS — Z11.4 SCREENING FOR HIV (HUMAN IMMUNODEFICIENCY VIRUS): ICD-10-CM

## 2021-01-14 PROCEDURE — G2012 BRIEF CHECK IN BY MD/QHP: HCPCS | Performed by: FAMILY MEDICINE

## 2021-01-14 NOTE — PROGRESS NOTES
Virtual Brief Visit    Assessment/Plan:    Problem List Items Addressed This Visit        Cardiovascular and Mediastinum    Essential hypertension - Primary       Musculoskeletal and Integument    Osteomyelitis of finger of right hand ([address] )       Other    Tobacco use    Mixed hyperlipidemia      Other Visit Diagnoses     Screening for HIV (human immunodeficiency virus)        Screening for cervical cancer        Screening for colorectal cancer              BMI Counseling: There is no height or weight on file to calculate BMI  The BMI is above normal  Nutrition recommendations include decreasing portion sizes, encouraging healthy choices of fruits and vegetables, limiting drinks that contain sugar, moderation in carbohydrate intake, increasing intake of lean protein, reducing intake of saturated and trans fat and reducing intake of cholesterol  Exercise recommendations include exercising 3-5 times per week  No pharmacotherapy was ordered  Patient referred to PCP due to patient being overweight  Reason for visit is No chief complaint on file  Encounter provider Nithya Steel MD    Provider located at [address] [facility] [address] [phone]    Recent Visits  No visits were found meeting these conditions  Showing recent visits within past 7 days and meeting all other requirements     Today's Visits  Date Type Provider Dept   01/14/21 Telemedicine Nithya Steel, [address] today's visits and meeting all other requirements     Future Appointments  No visits were found meeting these conditions  Showing future appointments within next 150 days and meeting all other requirements        After connecting through telephone, the patient was identified by name and date of birth  Ori Chavez was informed that this is a telemedicine visit and that the visit is being conducted through telephone  My office door was closed   No one else was in the room  She acknowledged consent and understanding of privacy and security of the platform  The patient has agreed to participate and understands she can discontinue the visit at any time  Patient is aware this is a billable service  Subjective    Ryan Abernathy is a 48 y o  female   Pt for a virtual visit  Pt  With depression tobacco abuse   HTN CVA and is  Doing  Better with less smoking and mood is  Better  With welbutrin       Past Medical History:   Diagnosis Date    Hypertension     Stroke ([address] ) 01/2019       Past Surgical History:   Procedure Laterality Date    CHOLECYSTECTOMY      HAND DEBRIDEMENT Right 12/1/2018    Procedure: DEBRIDEMENT HAND/FINGER ([address] OUT) WITH RAY AMPUTATION OF RIGHT INDEX FINGER;  Surgeon: Elise Gibbons DO;  Location: [facility];  Service: Orthopedics       Current Outpatient Medications   Medication Sig Dispense Refill    amLODIPine (NORVASC) 5 mg tablet Take 1 tablet (5 mg total) by mouth daily 90 tablet 1    aspirin (ECOTRIN) 325 mg EC tablet Take 325 mg by mouth daily      atorvastatin (LIPITOR) 40 mg tablet Take 1 tablet (40 mg total) by mouth daily 90 tablet 1    buPROPion (WELLBUTRIN XL) 150 mg 24 hr tablet Take 1 tablet (150 mg total) by mouth every morning 30 tablet 1    hydrOXYzine HCL (ATARAX) 25 mg tablet Take 1 tablet (25 mg total) by mouth every 6 (six) hours as needed for itching 90 tablet 1    lisinopril (ZESTRIL) 20 mg tablet Take 1 tablet (20 mg total) by mouth daily 90 tablet 1    venlafaxine (EFFEXOR-XR) 150 mg 24 hr capsule Take 1 capsule (150 mg total) by mouth daily 90 capsule 1     No current facility-administered medications for this visit  Allergies   Allergen Reactions    Bactrim [Sulfamethoxazole-Trimethoprim]        Review of Systems   Constitutional: Negative for activity change, appetite change, fatigue and fever     HENT: Negative for congestion, ear pain, postnasal drip, rhinorrhea, sinus pressure, sinus pain, sneezing and sore throat  Eyes: Negative for pain and redness  Respiratory: Negative for apnea, cough, chest tightness, shortness of breath and wheezing  Cardiovascular: Negative for chest pain, palpitations and leg swelling  Gastrointestinal: Negative for abdominal pain, constipation, diarrhea, nausea and vomiting  Endocrine: Negative for cold intolerance and heat intolerance  Genitourinary: Negative for difficulty urinating, dysuria, frequency, hematuria and urgency  Musculoskeletal: Negative for arthralgias, back pain, gait problem and myalgias  Skin: Negative for rash  Neurological: Positive for dizziness, speech difficulty and weakness  Negative for numbness and headaches  Hematological: Does not bruise/bleed easily  Psychiatric/Behavioral: Negative for agitation, confusion and hallucinations  There were no vitals filed for this visit  I spent 15 minutes directly with the patient during this visit    VIRTUAL VISIT DISCLAIMER    Yfn Michelle acknowledges that she has consented to an online visit or consultation  She understands that the online visit is based solely on information provided by her, and that, in the absence of a face-to-face physical evaluation by the physician, the diagnosis she receives is both limited and provisional in terms of accuracy and completeness  This is not intended to replace a full medical face-to-face evaluation by the physician  Yfn Michelle understands and accepts these terms

## 2021-01-26 DIAGNOSIS — F32.A DEPRESSION, UNSPECIFIED DEPRESSION TYPE: ICD-10-CM

## 2021-01-26 RX ORDER — BUPROPION HYDROCHLORIDE 150 MG/1
TABLET ORAL
Qty: 30 TABLET | Refills: 1 | Status: SHIPPED | OUTPATIENT
Start: 2021-01-26 | End: 2021-03-21

## 2021-02-04 DIAGNOSIS — F41.9 ANXIETY AND DEPRESSION: ICD-10-CM

## 2021-02-04 DIAGNOSIS — F32.A ANXIETY AND DEPRESSION: ICD-10-CM

## 2021-02-04 RX ORDER — HYDROXYZINE HYDROCHLORIDE 25 MG/1
25 TABLET, FILM COATED ORAL EVERY 6 HOURS PRN
Qty: 90 TABLET | Refills: 1 | Status: SHIPPED | OUTPATIENT
Start: 2021-02-04 | End: 2021-10-22 | Stop reason: SDUPTHER

## 2021-03-17 ENCOUNTER — OFFICE VISIT (OUTPATIENT)
Dept: FAMILY MEDICINE CLINIC | Facility: CLINIC | Age: 56
End: 2021-03-17
Payer: COMMERCIAL

## 2021-03-17 VITALS
OXYGEN SATURATION: 98 % | SYSTOLIC BLOOD PRESSURE: 120 MMHG | TEMPERATURE: 98.2 F | WEIGHT: 175 LBS | DIASTOLIC BLOOD PRESSURE: 72 MMHG | BODY MASS INDEX: 35.28 KG/M2 | RESPIRATION RATE: 20 BRPM | HEIGHT: 59 IN | HEART RATE: 77 BPM

## 2021-03-17 DIAGNOSIS — Z11.4 SCREENING FOR HIV (HUMAN IMMUNODEFICIENCY VIRUS): ICD-10-CM

## 2021-03-17 DIAGNOSIS — E78.2 MIXED HYPERLIPIDEMIA: ICD-10-CM

## 2021-03-17 DIAGNOSIS — M79.642 LEFT HAND PAIN: ICD-10-CM

## 2021-03-17 DIAGNOSIS — Z12.11 SCREENING FOR COLORECTAL CANCER: ICD-10-CM

## 2021-03-17 DIAGNOSIS — M86.9 OSTEOMYELITIS OF FINGER OF RIGHT HAND (HCC): ICD-10-CM

## 2021-03-17 DIAGNOSIS — F32.1 CURRENT MODERATE EPISODE OF MAJOR DEPRESSIVE DISORDER WITHOUT PRIOR EPISODE (HCC): ICD-10-CM

## 2021-03-17 DIAGNOSIS — I10 ESSENTIAL HYPERTENSION: Primary | ICD-10-CM

## 2021-03-17 DIAGNOSIS — Z12.12 SCREENING FOR COLORECTAL CANCER: ICD-10-CM

## 2021-03-17 DIAGNOSIS — E78.5 HYPERLIPIDEMIA, UNSPECIFIED: ICD-10-CM

## 2021-03-17 DIAGNOSIS — Z12.4 SCREENING FOR CERVICAL CANCER: ICD-10-CM

## 2021-03-17 PROBLEM — I63.9 STROKE (HCC): Status: ACTIVE | Noted: 2019-01-01

## 2021-03-17 PROCEDURE — 4004F PT TOBACCO SCREEN RCVD TLK: CPT | Performed by: FAMILY MEDICINE

## 2021-03-17 PROCEDURE — 99396 PREV VISIT EST AGE 40-64: CPT | Performed by: FAMILY MEDICINE

## 2021-03-17 PROCEDURE — 3078F DIAST BP <80 MM HG: CPT | Performed by: FAMILY MEDICINE

## 2021-03-17 PROCEDURE — 3008F BODY MASS INDEX DOCD: CPT | Performed by: FAMILY MEDICINE

## 2021-03-17 PROCEDURE — 3074F SYST BP LT 130 MM HG: CPT | Performed by: FAMILY MEDICINE

## 2021-03-17 RX ORDER — ATORVASTATIN CALCIUM 40 MG/1
40 TABLET, FILM COATED ORAL DAILY
Qty: 90 TABLET | Refills: 1 | Status: SHIPPED | OUTPATIENT
Start: 2021-03-17 | End: 2021-07-15

## 2021-03-17 NOTE — PROGRESS NOTES
Assessment/Plan:         Problem List Items Addressed This Visit        Cardiovascular and Mediastinum    Essential hypertension - Primary       Musculoskeletal and Integument    Osteomyelitis of finger of right hand ([facility] )       Other    Mixed hyperlipidemia      Other Visit Diagnoses     Screening for HIV (human immunodeficiency virus)        Screening for cervical cancer        Screening for colorectal cancer        Current moderate episode of major depressive disorder without prior episode ([facility] )                Subjective:      Patient ID: Armando Reyna is a 48 y o  female  This is a 50-year-old white female here today for yearly physical and checkup on her hypertension history of stroke hyperlipidemia depression anxiety and history of alcohol abuse  Patient is doing well she is little concerned about her weight gain  She is doing well with her medicines and she is due for lab work next time  Patient is ambulating better she does have a cane but she walks pretty good without it  Patient is no longer drinking and still trying to cut back on smoking she done to 6 cigarettes a day  She is making progress  Patient also for the problem with her right foot with some cracking in the appearance of foot fungus  Patient also with a fall onto her outstretched left hand  Patient has pain in her 4th and 3rd rays  Patient has seen Dr Billie Quintero lumbar 4th pedis for amputation I recommended she follow up with him if this is not getting any better  The following portions of the patient's history were reviewed and updated as appropriate:   Past Medical History:  She has a past medical history of Hypertension and Stroke ([facility] ) (01/2019)  ,  _______________________________________________________________________  Medical Problems:  does not have any pertinent problems on file ,  _______________________________________________________________________  Past Surgical History:   has a past surgical history that includes Cholecystectomy and Hand Debridement (Right, 12/1/2018)  ,  _______________________________________________________________________  Family History:  family history includes Diabetes in her maternal grandmother; No Known Problems in her brother, father, maternal grandfather, mother, paternal grandfather, paternal grandmother, sister, son, son, and son ,  _______________________________________________________________________  Social History:   reports that she has been smoking cigarettes  She has a 5 00 pack-year smoking history  She has never used smokeless tobacco  She reports previous alcohol use  She reports that she does not use drugs  ,  _______________________________________________________________________  Allergies:  is allergic to bactrim [sulfamethoxazole-trimethoprim]     _______________________________________________________________________  Current Outpatient Medications   Medication Sig Dispense Refill    amLODIPine (NORVASC) 5 mg tablet Take 1 tablet (5 mg total) by mouth daily 90 tablet 1    aspirin (ECOTRIN) 325 mg EC tablet Take 325 mg by mouth daily      atorvastatin (LIPITOR) 40 mg tablet Take 1 tablet (40 mg total) by mouth daily 90 tablet 1    buPROPion (WELLBUTRIN XL) 150 mg 24 hr tablet TAKE 1 TABLET BY MOUTH EVERY DAY IN THE MORNING 30 tablet 1    hydrOXYzine HCL (ATARAX) 25 mg tablet TAKE 1 TABLET (25 MG TOTAL) BY MOUTH EVERY 6 (SIX) HOURS AS NEEDED FOR ITCHING 90 tablet 1    lisinopril (ZESTRIL) 20 mg tablet Take 1 tablet (20 mg total) by mouth daily 90 tablet 1    venlafaxine (EFFEXOR-XR) 150 mg 24 hr capsule Take 1 capsule (150 mg total) by mouth daily 90 capsule 1     No current facility-administered medications for this visit       _______________________________________________________________________  Review of Systems   Constitutional: Negative for activity change, appetite change, fatigue and fever     HENT: Negative for congestion, ear pain, postnasal drip, rhinorrhea, sinus pressure, sinus pain, sneezing and sore throat  Eyes: Negative for pain and redness  Respiratory: Negative for apnea, cough, chest tightness, shortness of breath and wheezing  Cardiovascular: Negative for chest pain, palpitations and leg swelling  Gastrointestinal: Negative for abdominal pain, constipation, diarrhea, nausea and vomiting  Endocrine: Negative for cold intolerance and heat intolerance  Genitourinary: Negative for difficulty urinating, dysuria, frequency, hematuria and urgency  Musculoskeletal: Positive for gait problem  Negative for arthralgias, back pain and myalgias  Skin: Positive for rash  Neurological: Negative for dizziness, speech difficulty, weakness, numbness and headaches  Hematological: Does not bruise/bleed easily  Psychiatric/Behavioral: Negative for agitation, confusion and hallucinations  Objective:  Vitals:    03/17/21 1030   BP: 120/72   BP Location: Left arm   Patient Position: Sitting   Cuff Size: Standard   Pulse: 77   Resp: 20   Temp: 98 2 °F (36 8 °C)   TempSrc: Temporal   SpO2: 98%   Weight: 79 4 kg (175 lb)   Height: 4' 11" (1 499 m)     Body mass index is 35 35 kg/m²       Physical Exam

## 2021-03-21 DIAGNOSIS — F32.A DEPRESSION, UNSPECIFIED DEPRESSION TYPE: ICD-10-CM

## 2021-03-21 RX ORDER — BUPROPION HYDROCHLORIDE 150 MG/1
TABLET ORAL
Qty: 30 TABLET | Refills: 1 | Status: SHIPPED | OUTPATIENT
Start: 2021-03-21 | End: 2021-05-16

## 2021-05-16 DIAGNOSIS — F32.A DEPRESSION, UNSPECIFIED DEPRESSION TYPE: ICD-10-CM

## 2021-05-16 RX ORDER — BUPROPION HYDROCHLORIDE 150 MG/1
TABLET ORAL
Qty: 30 TABLET | Refills: 1 | Status: SHIPPED | OUTPATIENT
Start: 2021-05-16 | End: 2021-07-17

## 2021-06-15 LAB
ALBUMIN SERPL-MCNC: 4 G/DL (ref 3.6–5.1)
ALBUMIN/GLOB SERPL: 1.3 (CALC) (ref 1–2.5)
ALP SERPL-CCNC: 123 U/L (ref 37–153)
ALT SERPL-CCNC: 10 U/L (ref 6–29)
AST SERPL-CCNC: 9 U/L (ref 10–35)
BASOPHILS # BLD AUTO: 33 CELLS/UL (ref 0–200)
BASOPHILS NFR BLD AUTO: 0.3 %
BILIRUB SERPL-MCNC: 0.4 MG/DL (ref 0.2–1.2)
BUN SERPL-MCNC: 23 MG/DL (ref 7–25)
BUN/CREAT SERPL: ABNORMAL (CALC) (ref 6–22)
CALCIUM SERPL-MCNC: 9.1 MG/DL (ref 8.6–10.4)
CHLORIDE SERPL-SCNC: 103 MMOL/L (ref 98–110)
CHOLEST SERPL-MCNC: 159 MG/DL
CHOLEST/HDLC SERPL: 2.8 (CALC)
CO2 SERPL-SCNC: 26 MMOL/L (ref 20–32)
CREAT SERPL-MCNC: 0.85 MG/DL (ref 0.5–1.05)
EOSINOPHIL # BLD AUTO: 143 CELLS/UL (ref 15–500)
EOSINOPHIL NFR BLD AUTO: 1.3 %
ERYTHROCYTE [DISTWIDTH] IN BLOOD BY AUTOMATED COUNT: 13.3 % (ref 11–15)
GLOBULIN SER CALC-MCNC: 3.1 G/DL (CALC) (ref 1.9–3.7)
GLUCOSE SERPL-MCNC: 105 MG/DL (ref 65–99)
HCT VFR BLD AUTO: 39.3 % (ref 35–45)
HDLC SERPL-MCNC: 56 MG/DL
HGB BLD-MCNC: 12.9 G/DL (ref 11.7–15.5)
HIV 1+2 AB+HIV1 P24 AG SERPL QL IA: NORMAL
LDLC SERPL CALC-MCNC: 82 MG/DL (CALC)
LYMPHOCYTES # BLD AUTO: 2178 CELLS/UL (ref 850–3900)
LYMPHOCYTES NFR BLD AUTO: 19.8 %
MAGNESIUM SERPL-MCNC: 2.3 MG/DL (ref 1.5–2.5)
MCH RBC QN AUTO: 27.3 PG (ref 27–33)
MCHC RBC AUTO-ENTMCNC: 32.8 G/DL (ref 32–36)
MCV RBC AUTO: 83.3 FL (ref 80–100)
MONOCYTES # BLD AUTO: 440 CELLS/UL (ref 200–950)
MONOCYTES NFR BLD AUTO: 4 %
NEUTROPHILS # BLD AUTO: 8206 CELLS/UL (ref 1500–7800)
NEUTROPHILS NFR BLD AUTO: 74.6 %
NONHDLC SERPL-MCNC: 103 MG/DL (CALC)
PLATELET # BLD AUTO: 352 THOUSAND/UL (ref 140–400)
PMV BLD REES-ECKER: 10.2 FL (ref 7.5–12.5)
POTASSIUM SERPL-SCNC: 4.3 MMOL/L (ref 3.5–5.3)
PROT SERPL-MCNC: 7.1 G/DL (ref 6.1–8.1)
RBC # BLD AUTO: 4.72 MILLION/UL (ref 3.8–5.1)
SL AMB EGFR AFRICAN AMERICAN: 89 ML/MIN/1.73M2
SL AMB EGFR NON AFRICAN AMERICAN: 77 ML/MIN/1.73M2
SODIUM SERPL-SCNC: 140 MMOL/L (ref 135–146)
T4 FREE SERPL-MCNC: 1 NG/DL (ref 0.8–1.8)
TRIGL SERPL-MCNC: 111 MG/DL
TSH SERPL-ACNC: 1.09 MIU/L
URATE SERPL-MCNC: 5.3 MG/DL (ref 2.5–7)
WBC # BLD AUTO: 11 THOUSAND/UL (ref 3.8–10.8)

## 2021-06-19 DIAGNOSIS — I10 ESSENTIAL (PRIMARY) HYPERTENSION: ICD-10-CM

## 2021-06-19 RX ORDER — AMLODIPINE BESYLATE 5 MG/1
TABLET ORAL
Qty: 90 TABLET | Refills: 1 | Status: SHIPPED | OUTPATIENT
Start: 2021-06-19 | End: 2021-11-08

## 2021-06-20 NOTE — PROGRESS NOTES
Depression Screening and Follow-up Plan: Patient assessed for underlying major depression  Brief counseling provided and recommend additional follow-up/re-evaluation next office visit  Patient advised to follow-up with PCP for further management  Assessment/Plan:         Problem List Items Addressed This Visit        Cardiovascular and Mediastinum    Essential hypertension - Primary     Patient is stable with current anti-hypertensive medicine and continue to follow a low sodium diet and take current medication  All questions about this condition were answered today  Stroke [facility])     Patient is stable  and will continue present plan of care and reassess at next routine visit  All questions about this problem from patient were answered today  Other    Anxiety and depression     Patient to continue utilizing medical therapy as well and counseling sources as applicable for condition  If  suicidal thought or fear of suicide to contact 911 and seek help immediately  Meds reviewed and patient questions answered today         Tobacco use     Patient encouraged to quit using tobacco for that increases their risk for COPD, lung cancer,stroke, oral cancer and heart disease  If patient does not want to quit they should let me know  when they are interested in quitting  There are numerous options to use to quit and we can discuss them  Mixed hyperlipidemia     Patient  is stable with current medication and we discussed a low fat low cholesterol diet  Weight loss also discussed for this will help lower cholesterol also  Recheck lipids in 6 months  Subjective:      Patient ID: Saranya Borja is a 55 y o  female  This is a 28-year-old white female here today for check up on hypertension hyperlipidemia history of stroke history of substance abuse and is doing very well  Patient had her lab work done and she is doing fantastic    Patient still free of any substance abuse issues should going to [place] [id number] meeting recently high and is now living by herself and is very happily doing that  Patient ambulates with a cane that she uses and gets around quite well by herself  The following portions of the patient's history were reviewed and updated as appropriate:   Past Medical History:  She has a past medical history of Stroke ([facility] ) (01/2019)  ,  _______________________________________________________________________  Medical Problems:  does not have any pertinent problems on file ,  _______________________________________________________________________  Past Surgical History:   has a past surgical history that includes Cholecystectomy and Hand Debridement (Right, 12/1/2018)  ,  _______________________________________________________________________  Family History:  family history includes Diabetes in her maternal grandmother; No Known Problems in her brother, father, maternal grandfather, mother, paternal grandfather, paternal grandmother, sister, son, son, and son ,  _______________________________________________________________________  Social History:   reports that she has been smoking cigarettes  She has a 5 00 pack-year smoking history  She has never used smokeless tobacco  She reports previous alcohol use  She reports that she does not use drugs  ,  _______________________________________________________________________  Allergies:  is allergic to bactrim [sulfamethoxazole-trimethoprim]     _______________________________________________________________________  Current Outpatient Medications   Medication Sig Dispense Refill    amLODIPine (NORVASC) 5 mg tablet TAKE 1 TABLET BY MOUTH EVERY DAY 90 tablet 1    aspirin (ECOTRIN) 325 mg EC tablet Take 325 mg by mouth daily      atorvastatin (LIPITOR) 40 mg tablet Take 1 tablet (40 mg total) by mouth daily 90 tablet 1    buPROPion (WELLBUTRIN XL) 150 mg 24 hr tablet TAKE 1 TABLET BY MOUTH EVERY DAY IN THE MORNING 30 tablet 1    hydrOXYzine HCL (ATARAX) 25 mg tablet TAKE 1 TABLET (25 MG TOTAL) BY MOUTH EVERY 6 (SIX) HOURS AS NEEDED FOR ITCHING 90 tablet 1    lisinopril (ZESTRIL) 20 mg tablet Take 1 tablet (20 mg total) by mouth daily 90 tablet 1    venlafaxine (EFFEXOR-XR) 150 mg 24 hr capsule Take 1 capsule (150 mg total) by mouth daily 90 capsule 1     No current facility-administered medications for this visit      _______________________________________________________________________  Review of Systems   Constitutional: Negative for activity change, appetite change, fatigue and fever  HENT: Negative for congestion, ear pain, postnasal drip, rhinorrhea, sinus pressure, sinus pain, sneezing and sore throat  Eyes: Negative for pain and redness  Respiratory: Negative for apnea, cough, chest tightness, shortness of breath and wheezing  Cardiovascular: Negative for chest pain, palpitations and leg swelling  Gastrointestinal: Negative for abdominal pain, constipation, diarrhea, nausea and vomiting  Endocrine: Negative for cold intolerance and heat intolerance  Genitourinary: Negative for difficulty urinating, dysuria, frequency, hematuria and urgency  Musculoskeletal: Negative for arthralgias, back pain, gait problem and myalgias  Skin: Negative for rash  Neurological: Negative for dizziness, speech difficulty, weakness, numbness and headaches  Hematological: Does not bruise/bleed easily  Psychiatric/Behavioral: Negative for agitation, confusion and hallucinations  Objective: There were no vitals filed for this visit  There is no height or weight on file to calculate BMI  Physical Exam  Vitals and nursing note reviewed  Constitutional:       Appearance: She is well-developed  HENT:      Head: Normocephalic and atraumatic  Nose: Nose normal       Mouth/Throat:      Mouth: Mucous membranes are moist    Eyes:      General: No scleral icterus       Conjunctiva/sclera: Conjunctivae normal       Pupils: Pupils are equal, round, and reactive to light  Neck:      Thyroid: No thyromegaly  Cardiovascular:      Rate and Rhythm: Normal rate and regular rhythm  Pulmonary:      Effort: Pulmonary effort is normal       Breath sounds: Normal breath sounds  No wheezing  Abdominal:      General: Bowel sounds are normal  There is no distension  Palpations: Abdomen is soft  Tenderness: There is no abdominal tenderness  There is no guarding or rebound  Musculoskeletal:         General: No tenderness or deformity  Normal range of motion  Cervical back: Normal range of motion and neck supple  Skin:     General: Skin is warm and dry  Findings: No erythema or rash  Neurological:      Mental Status: She is alert and oriented to person, place, and time  Mental status is at baseline  Sensory: No sensory deficit  Motor: Weakness present  Gait: Gait abnormal    Psychiatric:         Mood and Affect: Mood normal          Behavior: Behavior normal          Thought Content:  Thought content normal          Judgment: Judgment normal

## 2021-06-21 ENCOUNTER — TELEPHONE (OUTPATIENT)
Dept: FAMILY MEDICINE CLINIC | Facility: CLINIC | Age: 56
End: 2021-06-21

## 2021-06-21 ENCOUNTER — OFFICE VISIT (OUTPATIENT)
Dept: FAMILY MEDICINE CLINIC | Facility: CLINIC | Age: 56
End: 2021-06-21
Payer: COMMERCIAL

## 2021-06-21 VITALS
SYSTOLIC BLOOD PRESSURE: 122 MMHG | TEMPERATURE: 97.9 F | OXYGEN SATURATION: 97 % | RESPIRATION RATE: 18 BRPM | BODY MASS INDEX: 35.28 KG/M2 | DIASTOLIC BLOOD PRESSURE: 74 MMHG | HEIGHT: 59 IN | HEART RATE: 68 BPM | WEIGHT: 175 LBS

## 2021-06-21 DIAGNOSIS — I10 ESSENTIAL HYPERTENSION: Primary | ICD-10-CM

## 2021-06-21 DIAGNOSIS — Z12.11 SCREENING FOR COLORECTAL CANCER: ICD-10-CM

## 2021-06-21 DIAGNOSIS — F32.A ANXIETY AND DEPRESSION: ICD-10-CM

## 2021-06-21 DIAGNOSIS — Z12.4 SCREENING FOR CERVICAL CANCER: ICD-10-CM

## 2021-06-21 DIAGNOSIS — Z11.59 NEED FOR HEPATITIS C SCREENING TEST: ICD-10-CM

## 2021-06-21 DIAGNOSIS — Z72.0 TOBACCO USE: ICD-10-CM

## 2021-06-21 DIAGNOSIS — I63.449 CEREBROVASCULAR ACCIDENT (CVA) DUE TO EMBOLISM OF CEREBELLAR ARTERY, UNSPECIFIED BLOOD VESSEL LATERALITY (HCC): ICD-10-CM

## 2021-06-21 DIAGNOSIS — Z12.12 SCREENING FOR COLORECTAL CANCER: ICD-10-CM

## 2021-06-21 DIAGNOSIS — F41.9 ANXIETY AND DEPRESSION: ICD-10-CM

## 2021-06-21 DIAGNOSIS — E78.2 MIXED HYPERLIPIDEMIA: ICD-10-CM

## 2021-06-21 PROCEDURE — 99214 OFFICE O/P EST MOD 30 MIN: CPT | Performed by: FAMILY MEDICINE

## 2021-06-21 PROCEDURE — 4004F PT TOBACCO SCREEN RCVD TLK: CPT | Performed by: FAMILY MEDICINE

## 2021-06-21 PROCEDURE — 3725F SCREEN DEPRESSION PERFORMED: CPT | Performed by: FAMILY MEDICINE

## 2021-06-21 PROCEDURE — 3008F BODY MASS INDEX DOCD: CPT | Performed by: FAMILY MEDICINE

## 2021-06-21 PROCEDURE — 3074F SYST BP LT 130 MM HG: CPT | Performed by: FAMILY MEDICINE

## 2021-06-21 PROCEDURE — 3078F DIAST BP <80 MM HG: CPT | Performed by: FAMILY MEDICINE

## 2021-07-08 DIAGNOSIS — F32.A DEPRESSION, UNSPECIFIED DEPRESSION TYPE: ICD-10-CM

## 2021-07-08 NOTE — TELEPHONE ENCOUNTER
Pt called and stated that she has been out of her venlafaxine (EFFEXOR-XR) 150 mg 24 hr capsule for a few days and needs a refill as soon as possible

## 2021-07-09 RX ORDER — VENLAFAXINE HYDROCHLORIDE 150 MG/1
150 CAPSULE, EXTENDED RELEASE ORAL DAILY
Qty: 90 CAPSULE | Refills: 1 | Status: SHIPPED | OUTPATIENT
Start: 2021-07-09 | End: 2021-11-09

## 2021-07-15 DIAGNOSIS — E78.5 HYPERLIPIDEMIA, UNSPECIFIED: ICD-10-CM

## 2021-07-15 DIAGNOSIS — I10 ESSENTIAL HYPERTENSION: ICD-10-CM

## 2021-07-15 RX ORDER — LISINOPRIL 20 MG/1
TABLET ORAL
Qty: 90 TABLET | Refills: 1 | Status: SHIPPED | OUTPATIENT
Start: 2021-07-15 | End: 2021-12-13

## 2021-07-15 RX ORDER — ATORVASTATIN CALCIUM 40 MG/1
TABLET, FILM COATED ORAL
Qty: 90 TABLET | Refills: 1 | Status: SHIPPED | OUTPATIENT
Start: 2021-07-15 | End: 2021-12-13

## 2021-07-16 DIAGNOSIS — F32.A DEPRESSION, UNSPECIFIED DEPRESSION TYPE: ICD-10-CM

## 2021-07-17 RX ORDER — BUPROPION HYDROCHLORIDE 150 MG/1
TABLET ORAL
Qty: 30 TABLET | Refills: 1 | Status: SHIPPED | OUTPATIENT
Start: 2021-07-17 | End: 2021-09-13

## 2021-09-01 ENCOUNTER — TELEPHONE (OUTPATIENT)
Dept: GASTROENTEROLOGY | Facility: CLINIC | Age: 56
End: 2021-09-01

## 2021-09-01 NOTE — TELEPHONE ENCOUNTER
Patient will call to schedule when she is able to figure out how she would get a ride    She takes [employer] bus for appointments

## 2021-09-01 NOTE — TELEPHONE ENCOUNTER
Received order from Dr Jossy Jackson to call and schedule new patient colonoscopy  I called and left message for patient to call and schedule  Will call again in 1 wk if she doesn't return call

## 2021-09-12 DIAGNOSIS — F32.A DEPRESSION, UNSPECIFIED DEPRESSION TYPE: ICD-10-CM

## 2021-09-13 RX ORDER — BUPROPION HYDROCHLORIDE 150 MG/1
TABLET ORAL
Qty: 30 TABLET | Refills: 1 | Status: SHIPPED | OUTPATIENT
Start: 2021-09-13 | End: 2021-11-09

## 2021-10-22 ENCOUNTER — OFFICE VISIT (OUTPATIENT)
Dept: FAMILY MEDICINE CLINIC | Facility: CLINIC | Age: 56
End: 2021-10-22
Payer: COMMERCIAL

## 2021-10-22 VITALS
SYSTOLIC BLOOD PRESSURE: 122 MMHG | RESPIRATION RATE: 18 BRPM | WEIGHT: 183 LBS | TEMPERATURE: 97.5 F | OXYGEN SATURATION: 97 % | HEIGHT: 59 IN | HEART RATE: 66 BPM | DIASTOLIC BLOOD PRESSURE: 84 MMHG | BODY MASS INDEX: 36.89 KG/M2

## 2021-10-22 DIAGNOSIS — Z72.0 TOBACCO USE: ICD-10-CM

## 2021-10-22 DIAGNOSIS — F32.A ANXIETY AND DEPRESSION: ICD-10-CM

## 2021-10-22 DIAGNOSIS — Z12.11 SCREEN FOR COLON CANCER: ICD-10-CM

## 2021-10-22 DIAGNOSIS — I10 ESSENTIAL HYPERTENSION: Primary | ICD-10-CM

## 2021-10-22 DIAGNOSIS — F41.9 ANXIETY AND DEPRESSION: ICD-10-CM

## 2021-10-22 DIAGNOSIS — E78.2 MIXED HYPERLIPIDEMIA: ICD-10-CM

## 2021-10-22 DIAGNOSIS — Z11.59 ENCOUNTER FOR HEPATITIS C SCREENING TEST FOR LOW RISK PATIENT: ICD-10-CM

## 2021-10-22 DIAGNOSIS — I63.449 CEREBROVASCULAR ACCIDENT (CVA) DUE TO EMBOLISM OF CEREBELLAR ARTERY, UNSPECIFIED BLOOD VESSEL LATERALITY (HCC): ICD-10-CM

## 2021-10-22 PROCEDURE — 99214 OFFICE O/P EST MOD 30 MIN: CPT | Performed by: FAMILY MEDICINE

## 2021-10-22 RX ORDER — HYDROXYZINE HYDROCHLORIDE 25 MG/1
25 TABLET, FILM COATED ORAL EVERY 6 HOURS PRN
Qty: 90 TABLET | Refills: 1 | Status: SHIPPED | OUTPATIENT
Start: 2021-10-22 | End: 2021-11-30

## 2021-11-06 DIAGNOSIS — I10 ESSENTIAL (PRIMARY) HYPERTENSION: ICD-10-CM

## 2021-11-08 DIAGNOSIS — F32.A DEPRESSION, UNSPECIFIED DEPRESSION TYPE: ICD-10-CM

## 2021-11-08 RX ORDER — AMLODIPINE BESYLATE 5 MG/1
TABLET ORAL
Qty: 90 TABLET | Refills: 1 | Status: SHIPPED | OUTPATIENT
Start: 2021-11-08 | End: 2022-04-28 | Stop reason: SDUPTHER

## 2021-11-09 RX ORDER — BUPROPION HYDROCHLORIDE 150 MG/1
TABLET ORAL
Qty: 30 TABLET | Refills: 1 | Status: SHIPPED | OUTPATIENT
Start: 2021-11-09 | End: 2022-01-05

## 2021-11-09 RX ORDER — VENLAFAXINE HYDROCHLORIDE 150 MG/1
CAPSULE, EXTENDED RELEASE ORAL
Qty: 60 CAPSULE | Refills: 2 | Status: SHIPPED | OUTPATIENT
Start: 2021-11-09 | End: 2022-04-28 | Stop reason: SDUPTHER

## 2021-11-30 DIAGNOSIS — F32.A ANXIETY AND DEPRESSION: ICD-10-CM

## 2021-11-30 DIAGNOSIS — F41.9 ANXIETY AND DEPRESSION: ICD-10-CM

## 2021-11-30 RX ORDER — HYDROXYZINE HYDROCHLORIDE 25 MG/1
TABLET, FILM COATED ORAL
Qty: 90 TABLET | Refills: 1 | Status: SHIPPED | OUTPATIENT
Start: 2021-11-30 | End: 2022-01-07

## 2021-12-12 DIAGNOSIS — I10 ESSENTIAL HYPERTENSION: ICD-10-CM

## 2021-12-12 DIAGNOSIS — E78.5 HYPERLIPIDEMIA, UNSPECIFIED: ICD-10-CM

## 2021-12-13 RX ORDER — ATORVASTATIN CALCIUM 40 MG/1
TABLET, FILM COATED ORAL
Qty: 90 TABLET | Refills: 1 | Status: SHIPPED | OUTPATIENT
Start: 2021-12-13 | End: 2022-04-28 | Stop reason: SDUPTHER

## 2021-12-13 RX ORDER — LISINOPRIL 20 MG/1
TABLET ORAL
Qty: 90 TABLET | Refills: 1 | Status: SHIPPED | OUTPATIENT
Start: 2021-12-13 | End: 2022-04-28 | Stop reason: SDUPTHER

## 2021-12-18 LAB — COLOGUARD RESULT REPORTABLE: POSITIVE

## 2021-12-21 DIAGNOSIS — R19.5 POSITIVE COLORECTAL CANCER SCREENING USING COLOGUARD TEST: Primary | ICD-10-CM

## 2022-01-05 DIAGNOSIS — F32.A DEPRESSION, UNSPECIFIED DEPRESSION TYPE: ICD-10-CM

## 2022-01-05 RX ORDER — BUPROPION HYDROCHLORIDE 150 MG/1
TABLET ORAL
Qty: 30 TABLET | Refills: 1 | Status: SHIPPED | OUTPATIENT
Start: 2022-01-05 | End: 2022-04-28 | Stop reason: SDUPTHER

## 2022-01-07 DIAGNOSIS — F32.A ANXIETY AND DEPRESSION: ICD-10-CM

## 2022-01-07 DIAGNOSIS — F41.9 ANXIETY AND DEPRESSION: ICD-10-CM

## 2022-01-07 RX ORDER — HYDROXYZINE HYDROCHLORIDE 25 MG/1
TABLET, FILM COATED ORAL
Qty: 90 TABLET | Refills: 1 | Status: SHIPPED | OUTPATIENT
Start: 2022-01-07 | End: 2022-02-14

## 2022-01-27 NOTE — PROGRESS NOTES
BMI Counseling: There is no height or weight on file to calculate BMI  The BMI is above normal  Nutrition recommendations include decreasing portion sizes, encouraging healthy choices of fruits and vegetables, decreasing fast food intake, consuming healthier snacks, limiting drinks that contain sugar, moderation in carbohydrate intake, increasing intake of lean protein, reducing intake of saturated and trans fat and reducing intake of cholesterol  Exercise recommendations include exercising 3-5 times per week  No pharmacotherapy was ordered  Patient referred to PCP  Rationale for BMI follow-up plan is due to patient being overweight or obese  Depression Screening and Follow-up Plan: Patient assessed for underlying major depression  Brief counseling provided and recommend additional follow-up/re-evaluation next office visit  Patient advised to follow-up with PCP for further management  Tobacco Cessation Counseling: Tobacco cessation counseling was provided  The patient is sincerely urged to quit consumption of tobacco  She is ready to quit tobacco  Medication options and side effects of medication not discussed  Patient agreed to medication  Assessment/Plan:         Problem List Items Addressed This Visit        Cardiovascular and Mediastinum    Essential hypertension - Primary     Patient is stable with current anti-hypertensive medicine and continue to follow a low sodium diet and take current medication  All questions about this condition were answered today  Stroke [facility])     Patient is stable  and will continue present plan of care and reassess at next routine visit  All questions about this problem from patient were answered today  Other    Anxiety and depression     Patient to continue utilizing medical therapy as well and counseling sources as applicable for condition  If  suicidal thought or fear of suicide to contact 911 and seek help immediately   Meds reviewed and patient questions answered today         Tobacco use     Patient encouraged to quit using tobacco for that increases their risk for COPD, lung cancer,stroke, oral cancer and heart disease  If patient does not want to quit they should let me know  when they are interested in quitting  There are numerous options to use to quit and we can discuss them  Mixed hyperlipidemia     Patient  is stable with current medication and we discussed a low fat low cholesterol diet  Weight loss also discussed for this will help lower cholesterol also  Recheck lipids in 6 months  Other Visit Diagnoses     Need for hepatitis C screening test        Encounter for screening mammogram for breast cancer                Subjective:      Patient ID: Roya Stark is a 55 y o  female  This is a 62-year-old female here today for checkup on multiple medical problems patient history of hypertension history of cerebellar stroke history of substance abuse insomnia gait dysfunction hyperlipidemia and tobacco abuse  Patient is very proud that she has new T is doing well with that  Patient still adjusting to her dentures  Patient also having more problems with some sleeping at night  Due to her addictive potential were good try to avoid medicines that are addictive patient has used melatonin to no real avail for her sleeping I will see about getting herself Belsomra she can take that at 10 mg at bedtime  Patient also recently had a positive Cologuard and is scheduled for colonoscopy in March  Patient has issues with getting around because she does not drive a car at this point because she has none some transportation is an issue for her  The following portions of the patient's history were reviewed and updated as appropriate:   Past Medical History:  She has a past medical history of Stroke ([facility] ) (01/2019)  ,  _______________________________________________________________________  Medical Problems:  does not have any pertinent problems on file ,  _______________________________________________________________________  Past Surgical History:   has a past surgical history that includes Cholecystectomy and Hand Debridement (Right, 12/1/2018)  ,  _______________________________________________________________________  Family History:  family history includes Diabetes in her maternal grandmother; No Known Problems in her brother, father, maternal grandfather, mother, paternal grandfather, paternal grandmother, sister, son, son, and son ,  _______________________________________________________________________  Social History:   reports that she has been smoking cigarettes  She has a 5 00 pack-year smoking history  She has never used smokeless tobacco  She reports previous alcohol use  She reports that she does not use drugs  ,  _______________________________________________________________________  Allergies:  is allergic to bactrim [sulfamethoxazole-trimethoprim]     _______________________________________________________________________  Current Outpatient Medications   Medication Sig Dispense Refill    amLODIPine (NORVASC) 5 mg tablet TAKE 1 TABLET BY MOUTH EVERY DAY 90 tablet 1    aspirin (ECOTRIN) 325 mg EC tablet Take 325 mg by mouth daily      atorvastatin (LIPITOR) 40 mg tablet TAKE 1 TABLET BY MOUTH EVERY DAY 90 tablet 1    buPROPion (WELLBUTRIN XL) 150 mg 24 hr tablet TAKE 1 TABLET BY MOUTH EVERY DAY IN THE MORNING 30 tablet 1    hydrOXYzine HCL (ATARAX) 25 mg tablet TAKE 1 TABLET BY MOUTH EVERY 6 HOURS AS NEEDED FOR ITCHING   90 tablet 1    lisinopril (ZESTRIL) 20 mg tablet TAKE 1 TABLET BY MOUTH EVERY DAY 90 tablet 1    venlafaxine (EFFEXOR-XR) 150 mg 24 hr capsule TAKE 1 CAPSULE BY MOUTH EVERY DAY 60 capsule 2     No current facility-administered medications for this visit      _______________________________________________________________________  Review of Systems   Constitutional: Negative for activity change, appetite change, fatigue and fever  HENT: Negative for congestion, ear pain, postnasal drip, rhinorrhea, sinus pressure, sinus pain, sneezing and sore throat  Eyes: Negative for pain and redness  Respiratory: Negative for apnea, cough, chest tightness, shortness of breath and wheezing  Cardiovascular: Negative for chest pain, palpitations and leg swelling  Gastrointestinal: Negative for abdominal pain, constipation, diarrhea, nausea and vomiting  Endocrine: Negative for cold intolerance and heat intolerance  Genitourinary: Negative for difficulty urinating, dysuria, frequency, hematuria and urgency  Musculoskeletal: Negative for arthralgias, back pain, gait problem and myalgias  Skin: Negative for rash  Neurological: Negative for dizziness, speech difficulty, weakness, numbness and headaches  Hematological: Does not bruise/bleed easily  Psychiatric/Behavioral: Negative for agitation, confusion and hallucinations  Objective: There were no vitals filed for this visit  There is no height or weight on file to calculate BMI  Physical Exam  Vitals and nursing note reviewed  Constitutional:       Appearance: She is well-developed  HENT:      Head: Normocephalic and atraumatic  Nose: Nose normal       Mouth/Throat:      Mouth: Mucous membranes are moist    Eyes:      General: No scleral icterus  Conjunctiva/sclera: Conjunctivae normal       Pupils: Pupils are equal, round, and reactive to light  Neck:      Thyroid: No thyromegaly  Cardiovascular:      Rate and Rhythm: Normal rate and regular rhythm  Pulmonary:      Effort: Pulmonary effort is normal       Breath sounds: Normal breath sounds  No wheezing  Abdominal:      General: Bowel sounds are normal  There is no distension  Palpations: Abdomen is soft  Tenderness: There is no abdominal tenderness  There is no guarding or rebound  Musculoskeletal:         General: No tenderness or deformity   Normal range of motion  Cervical back: Normal range of motion and neck supple  Skin:     General: Skin is warm and dry  Findings: No erythema or rash  Neurological:      Mental Status: She is alert and oriented to person, place, and time  Sensory: No sensory deficit  Motor: Weakness present  Gait: Gait abnormal    Psychiatric:         Mood and Affect: Mood normal          Behavior: Behavior normal          Thought Content:  Thought content normal          Judgment: Judgment normal

## 2022-01-28 ENCOUNTER — OFFICE VISIT (OUTPATIENT)
Dept: FAMILY MEDICINE CLINIC | Facility: CLINIC | Age: 57
End: 2022-01-28
Payer: COMMERCIAL

## 2022-01-28 VITALS
WEIGHT: 180 LBS | BODY MASS INDEX: 36.29 KG/M2 | RESPIRATION RATE: 18 BRPM | OXYGEN SATURATION: 99 % | DIASTOLIC BLOOD PRESSURE: 78 MMHG | SYSTOLIC BLOOD PRESSURE: 116 MMHG | HEART RATE: 70 BPM | TEMPERATURE: 98.1 F | HEIGHT: 59 IN

## 2022-01-28 DIAGNOSIS — F32.A ANXIETY AND DEPRESSION: ICD-10-CM

## 2022-01-28 DIAGNOSIS — Z72.0 TOBACCO USE: ICD-10-CM

## 2022-01-28 DIAGNOSIS — Z11.59 NEED FOR HEPATITIS C SCREENING TEST: ICD-10-CM

## 2022-01-28 DIAGNOSIS — F41.9 ANXIETY AND DEPRESSION: ICD-10-CM

## 2022-01-28 DIAGNOSIS — I10 ESSENTIAL HYPERTENSION: Primary | ICD-10-CM

## 2022-01-28 DIAGNOSIS — G47.09 OTHER INSOMNIA: ICD-10-CM

## 2022-01-28 DIAGNOSIS — E78.2 MIXED HYPERLIPIDEMIA: ICD-10-CM

## 2022-01-28 DIAGNOSIS — Z12.31 ENCOUNTER FOR SCREENING MAMMOGRAM FOR BREAST CANCER: ICD-10-CM

## 2022-01-28 DIAGNOSIS — Z12.31 SCREENING MAMMOGRAM FOR HIGH-RISK PATIENT: ICD-10-CM

## 2022-01-28 DIAGNOSIS — I63.449 CEREBROVASCULAR ACCIDENT (CVA) DUE TO EMBOLISM OF CEREBELLAR ARTERY, UNSPECIFIED BLOOD VESSEL LATERALITY (HCC): ICD-10-CM

## 2022-01-28 PROCEDURE — 99214 OFFICE O/P EST MOD 30 MIN: CPT | Performed by: FAMILY MEDICINE

## 2022-01-28 RX ORDER — SUVOREXANT 10 MG/1
10 TABLET, FILM COATED ORAL
Qty: 30 TABLET | Refills: 1 | Status: SHIPPED | OUTPATIENT
Start: 2022-01-28 | End: 2022-04-28

## 2022-02-11 DIAGNOSIS — F32.A ANXIETY AND DEPRESSION: ICD-10-CM

## 2022-02-11 DIAGNOSIS — F41.9 ANXIETY AND DEPRESSION: ICD-10-CM

## 2022-02-14 RX ORDER — HYDROXYZINE HYDROCHLORIDE 25 MG/1
TABLET, FILM COATED ORAL
Qty: 90 TABLET | Refills: 1 | Status: SHIPPED | OUTPATIENT
Start: 2022-02-14 | End: 2022-03-28

## 2022-03-20 ENCOUNTER — ANESTHESIA EVENT (OUTPATIENT)
Dept: ANESTHESIOLOGY | Facility: HOSPITAL | Age: 57
End: 2022-03-20

## 2022-03-20 ENCOUNTER — ANESTHESIA (OUTPATIENT)
Dept: ANESTHESIOLOGY | Facility: HOSPITAL | Age: 57
End: 2022-03-20

## 2022-03-21 ENCOUNTER — ANESTHESIA (OUTPATIENT)
Dept: GASTROENTEROLOGY | Facility: HOSPITAL | Age: 57
End: 2022-03-21

## 2022-03-21 ENCOUNTER — HOSPITAL ENCOUNTER (OUTPATIENT)
Dept: GASTROENTEROLOGY | Facility: HOSPITAL | Age: 57
Setting detail: OUTPATIENT SURGERY
Discharge: HOME | End: 2022-03-21
Attending: COLON & RECTAL SURGERY | Admitting: COLON & RECTAL SURGERY
Payer: COMMERCIAL

## 2022-03-21 ENCOUNTER — ANESTHESIA EVENT (OUTPATIENT)
Dept: GASTROENTEROLOGY | Facility: HOSPITAL | Age: 57
End: 2022-03-21

## 2022-03-21 VITALS
OXYGEN SATURATION: 99 % | WEIGHT: 180 LBS | TEMPERATURE: 97.3 F | DIASTOLIC BLOOD PRESSURE: 82 MMHG | HEART RATE: 66 BPM | HEIGHT: 59 IN | BODY MASS INDEX: 36.29 KG/M2 | RESPIRATION RATE: 18 BRPM | SYSTOLIC BLOOD PRESSURE: 129 MMHG

## 2022-03-21 DIAGNOSIS — R19.5 POSITIVE COLORECTAL CANCER SCREENING USING COLOGUARD TEST: ICD-10-CM

## 2022-03-21 PROCEDURE — 45380 COLONOSCOPY AND BIOPSY: CPT | Performed by: COLON & RECTAL SURGERY

## 2022-03-21 PROCEDURE — 99203 OFFICE O/P NEW LOW 30 MIN: CPT | Performed by: COLON & RECTAL SURGERY

## 2022-03-21 PROCEDURE — 88305 TISSUE EXAM BY PATHOLOGIST: CPT | Performed by: PATHOLOGY

## 2022-03-21 RX ORDER — LIDOCAINE HYDROCHLORIDE 20 MG/ML
INJECTION, SOLUTION EPIDURAL; INFILTRATION; INTRACAUDAL; PERINEURAL AS NEEDED
Status: DISCONTINUED | OUTPATIENT
Start: 2022-03-21 | End: 2022-03-21

## 2022-03-21 RX ORDER — PROPOFOL 10 MG/ML
INJECTION, EMULSION INTRAVENOUS AS NEEDED
Status: DISCONTINUED | OUTPATIENT
Start: 2022-03-21 | End: 2022-03-21

## 2022-03-21 RX ORDER — PROPOFOL 10 MG/ML
INJECTION, EMULSION INTRAVENOUS CONTINUOUS PRN
Status: DISCONTINUED | OUTPATIENT
Start: 2022-03-21 | End: 2022-03-21

## 2022-03-21 RX ORDER — SODIUM CHLORIDE 9 MG/ML
INJECTION, SOLUTION INTRAVENOUS CONTINUOUS PRN
Status: DISCONTINUED | OUTPATIENT
Start: 2022-03-21 | End: 2022-03-21

## 2022-03-21 RX ADMIN — LIDOCAINE HYDROCHLORIDE 40 MG: 20 INJECTION, SOLUTION EPIDURAL; INFILTRATION; INTRACAUDAL; PERINEURAL at 10:57

## 2022-03-21 RX ADMIN — PROPOFOL 100 MG: 10 INJECTION, EMULSION INTRAVENOUS at 10:57

## 2022-03-21 RX ADMIN — PROPOFOL 60 MG: 10 INJECTION, EMULSION INTRAVENOUS at 11:01

## 2022-03-21 RX ADMIN — SODIUM CHLORIDE: 0.9 INJECTION, SOLUTION INTRAVENOUS at 10:41

## 2022-03-21 RX ADMIN — PROPOFOL 100 MCG/KG/MIN: 10 INJECTION, EMULSION INTRAVENOUS at 10:57

## 2022-03-21 NOTE — ANESTHESIA POSTPROCEDURE EVALUATION
Post-Op Assessment Note    CV Status:  Stable  Pain Score: 0    Pain management: adequate     Mental Status:  Alert and awake   Hydration Status:  Euvolemic   PONV Controlled:  Controlled   Airway Patency:  Patent      Post Op Vitals Reviewed: Yes      Staff: CRNA         No complications documented      BP   123/73   Temp   97   Pulse  77   Resp   16   SpO2   100

## 2022-03-21 NOTE — H&P
History and Physical   Colon and Rectal Surgery   Familia Yeung 55 y o  female MRN: [id number]  Unit/Bed#:  Encounter: [id number]  03/21/22   @NOW    No chief complaint on file          History of Present Illness   HPI:  Familia Yeung is a 55 y o  female who presents for colonoscopy for positive Cologuard      Historical Information   Past Medical History:   Diagnosis Date    Stroke ([address] ) 01/2019     Past Surgical History:   Procedure Laterality Date    CHOLECYSTECTOMY      HAND DEBRIDEMENT Right 12/1/2018    Procedure: DEBRIDEMENT HAND/FINGER ([address]) WITH RAY AMPUTATION OF RIGHT INDEX FINGER;  Surgeon: Yulia Zhao DO;  Location: [address] [place] [address];  Service: Orthopedics       Meds/Allergies     (Not in a hospital admission)        Current Outpatient Medications:     amLODIPine (NORVASC) 5 mg tablet, TAKE 1 TABLET BY MOUTH EVERY DAY, Disp: 90 tablet, Rfl: 1    aspirin (ECOTRIN) 325 mg EC tablet, Take 325 mg by mouth daily, Disp: , Rfl:     atorvastatin (LIPITOR) 40 mg tablet, TAKE 1 TABLET BY MOUTH EVERY DAY, Disp: 90 tablet, Rfl: 1    buPROPion (WELLBUTRIN XL) 150 mg 24 hr tablet, TAKE 1 TABLET BY MOUTH EVERY DAY IN THE MORNING, Disp: 30 tablet, Rfl: 1    hydrOXYzine HCL (ATARAX) 25 mg tablet, TAKE 1 TABLET BY MOUTH EVERY 6 HOURS AS NEEDED FOR ITCHING , Disp: 90 tablet, Rfl: 1    lisinopril (ZESTRIL) 20 mg tablet, TAKE 1 TABLET BY MOUTH EVERY DAY, Disp: 90 tablet, Rfl: 1    Suvorexant (Belsomra) 10 MG TABS, Take 1 tablet (10 mg total) by mouth daily at bedtime, Disp: 30 tablet, Rfl: 1    venlafaxine (EFFEXOR-XR) 150 mg 24 hr capsule, TAKE 1 CAPSULE BY MOUTH EVERY DAY, Disp: 60 capsule, Rfl: 2    Allergies   Allergen Reactions    Bactrim [Sulfamethoxazole-Trimethoprim] Hives         Social History   Social History     Substance and Sexual Activity   Alcohol Use Not Currently    Comment: occasional     Social History     Substance and Sexual Activity   Drug Use No     Social History     Tobacco Use   Smoking Status Current Every Day Smoker    Packs/day: 0 25    Years: 20 00    Pack years: 5 00    Types: Cigarettes   Smokeless Tobacco Never Used   Tobacco Comment    6 CIGARRETTES A DAY         Family History:   Family History   Problem Relation Age of Onset    Diabetes Maternal Grandmother     No Known Problems Mother     No Known Problems Father     No Known Problems Sister     No Known Problems Maternal Grandfather     No Known Problems Paternal Grandmother     No Known Problems Paternal Grandfather     No Known Problems Brother     No Known Problems Son     No Known Problems Son     No Known Problems Son          Objective     Current Vitals:      No intake or output data in the 24 hours ending 03/21/22 1003    Physical Exam:  General:  Resting comfortably in bed   Eyes:Sclera anicteric  ENT: Trachea midline  Pulm:  Symmetric chest raise  No respiratory Distress  CV:  Regular on monitor  Abdomen:  Soft NT ND  Extremities:  No clubbing/ cyanosis/ edema    Lab Results: I have personally reviewed pertinent lab results  Imaging: I have personally reviewed pertinent reports  ASSESSMENT:  Saranya Borja is a 55 y o  female who presents for outpatient colonoscopy  PLAN:  For colonoscopy    Risks/ Benefits reviewed to include but not limited to anesthesia, bleeding, missed lesions, and colonoscopic perforation requiring surgery

## 2022-03-26 DIAGNOSIS — F41.9 ANXIETY AND DEPRESSION: ICD-10-CM

## 2022-03-26 DIAGNOSIS — F32.A ANXIETY AND DEPRESSION: ICD-10-CM

## 2022-03-28 RX ORDER — HYDROXYZINE HYDROCHLORIDE 25 MG/1
TABLET, FILM COATED ORAL
Qty: 90 TABLET | Refills: 1 | Status: SHIPPED | OUTPATIENT
Start: 2022-03-28 | End: 2022-04-28 | Stop reason: SDUPTHER

## 2022-04-27 NOTE — PROGRESS NOTES
Depression Screening and Follow-up Plan: Patient assessed for underlying major depression  Brief counseling provided and recommend additional follow-up/re-evaluation next office visit  Patient advised to follow-up with PCP for further management  Assessment/Plan:         Problem List Items Addressed This Visit        Cardiovascular and Mediastinum    Essential hypertension     Patient is stable with current anti-hypertensive medicine and continue to follow a low sodium diet and take current medication  All questions about this condition were answered today  Relevant Medications    amLODIPine (NORVASC) 5 mg tablet    lisinopril (ZESTRIL) 20 mg tablet    Other Relevant Orders    TSH + Free T4    Comprehensive metabolic panel    CBC and differential    Magnesium    Uric acid    Urinalysis with microscopic    Stroke [facility])     Patient is stable  and will continue present plan of care and reassess at next routine visit  All questions about this problem from patient were answered today  Other    Anxiety and depression     Patient to continue utilizing medical therapy as well and counseling sources as applicable for condition  If  suicidal thought or fear of suicide to contact 911 and seek help immediately  Meds reviewed and patient questions answered today         Relevant Medications    buPROPion (WELLBUTRIN XL) 150 mg 24 hr tablet    hydrOXYzine HCL (ATARAX) 25 mg tablet    venlafaxine (EFFEXOR-XR) 150 mg 24 hr capsule    Tobacco use     Patient encouraged to quit using tobacco for that increases their risk for COPD, lung cancer,stroke, oral cancer and heart disease  If patient does not want to quit they should let me know  when they are interested in quitting  There are numerous options to use to quit and we can discuss them  Mixed hyperlipidemia     Patient  is stable with current medication and we discussed a low fat low cholesterol diet   Weight loss also discussed for this will help lower cholesterol also  Recheck lipids in 6 months  Relevant Medications    atorvastatin (LIPITOR) 40 mg tablet    Other Relevant Orders    Lipid Panel with Direct LDL reflex      Other Visit Diagnoses     Well adult exam    -  Primary    Need for hepatitis C screening test        Relevant Orders    Hepatitis C antibody    Screening mammogram for high-risk patient        Relevant Orders    Mammo screening bilateral w 3d & cad    Essential (primary) hypertension        Relevant Medications    amLODIPine (NORVASC) 5 mg tablet    lisinopril (ZESTRIL) 20 mg tablet    Hyperlipidemia, unspecified        Relevant Medications    atorvastatin (LIPITOR) 40 mg tablet    Other Relevant Orders    Lipid Panel with Direct LDL reflex    Depression, unspecified depression type        Relevant Medications    buPROPion (WELLBUTRIN XL) 150 mg 24 hr tablet    hydrOXYzine HCL (ATARAX) 25 mg tablet    venlafaxine (EFFEXOR-XR) 150 mg 24 hr capsule            Subjective:      Patient ID: Jyoti Lentz is a 55 y o  female  52-year-old female here today for her yearly physical exam as well as checkup on multiple medical problems  Patient history of CVA with hypertension and depression  Patient also history of substance abuse and is still recovering from that she is just finishing up her recovery program and she still goes to [place] about 3 times a week or so  Patient is improved continues to improve she is walk without any assistance of a cane or a walker today  Patient will have all her medicines refilled and will check her labs for her next appointment  The following portions of the patient's history were reviewed and updated as appropriate:   Past Medical History:  She has a past medical history of Anxiety, Depression, Hypertension, and Stroke ([facility] [address] ) (01/2019)  ,  _______________________________________________________________________  Medical Problems:  does not have any pertinent problems on file ,  _______________________________________________________________________  Past Surgical History:   has a past surgical history that includes Cholecystectomy and Hand Debridement (Right, 12/1/2018)  ,  _______________________________________________________________________  Family History:  family history includes Diabetes in her maternal grandmother; No Known Problems in her brother, father, maternal grandfather, mother, paternal grandfather, paternal grandmother, sister, son, son, and son ,  _______________________________________________________________________  Social History:   reports that she has been smoking cigarettes  She has a 5 00 pack-year smoking history  She has never used smokeless tobacco  She reports previous alcohol use  She reports that she does not use drugs  ,  _______________________________________________________________________  Allergies:  is allergic to bactrim [sulfamethoxazole-trimethoprim]     _______________________________________________________________________  Current Outpatient Medications   Medication Sig Dispense Refill    amLODIPine (NORVASC) 5 mg tablet Take 1 tablet (5 mg total) by mouth daily 90 tablet 1    aspirin (ECOTRIN) 325 mg EC tablet Take 325 mg by mouth daily      atorvastatin (LIPITOR) 40 mg tablet Take 1 tablet (40 mg total) by mouth daily 90 tablet 1    buPROPion (WELLBUTRIN XL) 150 mg 24 hr tablet Take 1 tablet (150 mg total) by mouth every morning 90 tablet 1    hydrOXYzine HCL (ATARAX) 25 mg tablet Take 1 tablet (25 mg total) by mouth every 6 (six) hours as needed for itching 90 tablet 3    lisinopril (ZESTRIL) 20 mg tablet Take 1 tablet (20 mg total) by mouth daily 90 tablet 1    venlafaxine (EFFEXOR-XR) 150 mg 24 hr capsule Take 1 capsule (150 mg total) by mouth daily 60 capsule 2     No current facility-administered medications for this visit      _______________________________________________________________________  Review of Systems Constitutional: Negative for activity change, appetite change, fatigue and fever  HENT: Negative for congestion, ear pain, postnasal drip, rhinorrhea, sinus pressure, sinus pain, sneezing and sore throat  Eyes: Negative for pain and redness  Respiratory: Negative for apnea, cough, chest tightness, shortness of breath and wheezing  Cardiovascular: Negative for chest pain, palpitations and leg swelling  Gastrointestinal: Negative for abdominal pain, constipation, diarrhea, nausea and vomiting  Endocrine: Negative for cold intolerance and heat intolerance  Genitourinary: Negative for difficulty urinating, dysuria, frequency, hematuria and urgency  Musculoskeletal: Positive for gait problem  Negative for arthralgias, back pain and myalgias  Skin: Negative for rash  Neurological: Positive for weakness  Negative for dizziness, speech difficulty, numbness and headaches  Hematological: Does not bruise/bleed easily  Psychiatric/Behavioral: Negative for agitation, confusion and hallucinations  Objective:  Vitals:    04/28/22 1416   BP: 142/84   BP Location: Left arm   Patient Position: Sitting   Cuff Size: Standard   Pulse: 68   Resp: 18   Temp: 98 4 °F (36 9 °C)   SpO2: 98%   Weight: 79 6 kg (175 lb 6 4 oz)   Height: 4' 11" (1 499 m)     Body mass index is 35 43 kg/m²  Physical Exam  Vitals and nursing note reviewed  Constitutional:       Appearance: She is well-developed  HENT:      Head: Normocephalic and atraumatic  Nose: Nose normal       Mouth/Throat:      Mouth: Mucous membranes are moist    Eyes:      General: No scleral icterus  Conjunctiva/sclera: Conjunctivae normal       Pupils: Pupils are equal, round, and reactive to light  Neck:      Thyroid: No thyromegaly  Cardiovascular:      Rate and Rhythm: Normal rate and regular rhythm  Pulmonary:      Effort: Pulmonary effort is normal       Breath sounds: Normal breath sounds  No wheezing     Abdominal: General: Bowel sounds are normal  There is no distension  Palpations: Abdomen is soft  Tenderness: There is no abdominal tenderness  There is no guarding or rebound  Musculoskeletal:         General: No tenderness or deformity  Normal range of motion  Cervical back: Normal range of motion and neck supple  Skin:     General: Skin is warm and dry  Findings: No erythema or rash  Neurological:      Mental Status: She is alert and oriented to person, place, and time  Sensory: No sensory deficit  Gait: Gait abnormal    Psychiatric:         Mood and Affect: Mood normal          Behavior: Behavior normal          Thought Content:  Thought content normal          Judgment: Judgment normal

## 2022-04-28 ENCOUNTER — OFFICE VISIT (OUTPATIENT)
Dept: FAMILY MEDICINE CLINIC | Facility: CLINIC | Age: 57
End: 2022-04-28
Payer: COMMERCIAL

## 2022-04-28 VITALS
OXYGEN SATURATION: 98 % | TEMPERATURE: 98.4 F | DIASTOLIC BLOOD PRESSURE: 84 MMHG | HEART RATE: 68 BPM | SYSTOLIC BLOOD PRESSURE: 142 MMHG | RESPIRATION RATE: 18 BRPM | BODY MASS INDEX: 35.36 KG/M2 | HEIGHT: 59 IN | WEIGHT: 175.4 LBS

## 2022-04-28 DIAGNOSIS — I10 ESSENTIAL (PRIMARY) HYPERTENSION: ICD-10-CM

## 2022-04-28 DIAGNOSIS — E78.2 MIXED HYPERLIPIDEMIA: ICD-10-CM

## 2022-04-28 DIAGNOSIS — Z72.0 TOBACCO USE: ICD-10-CM

## 2022-04-28 DIAGNOSIS — E78.5 HYPERLIPIDEMIA, UNSPECIFIED: ICD-10-CM

## 2022-04-28 DIAGNOSIS — Z12.31 SCREENING MAMMOGRAM FOR HIGH-RISK PATIENT: ICD-10-CM

## 2022-04-28 DIAGNOSIS — Z11.59 NEED FOR HEPATITIS C SCREENING TEST: ICD-10-CM

## 2022-04-28 DIAGNOSIS — F41.9 ANXIETY AND DEPRESSION: ICD-10-CM

## 2022-04-28 DIAGNOSIS — I10 ESSENTIAL HYPERTENSION: ICD-10-CM

## 2022-04-28 DIAGNOSIS — I63.449 CEREBROVASCULAR ACCIDENT (CVA) DUE TO EMBOLISM OF CEREBELLAR ARTERY, UNSPECIFIED BLOOD VESSEL LATERALITY (HCC): ICD-10-CM

## 2022-04-28 DIAGNOSIS — F32.A ANXIETY AND DEPRESSION: ICD-10-CM

## 2022-04-28 DIAGNOSIS — F32.A DEPRESSION, UNSPECIFIED DEPRESSION TYPE: ICD-10-CM

## 2022-04-28 DIAGNOSIS — Z00.00 WELL ADULT EXAM: Primary | ICD-10-CM

## 2022-04-28 PROCEDURE — 99396 PREV VISIT EST AGE 40-64: CPT | Performed by: FAMILY MEDICINE

## 2022-04-28 RX ORDER — HYDROXYZINE HYDROCHLORIDE 25 MG/1
25 TABLET, FILM COATED ORAL EVERY 6 HOURS PRN
Qty: 90 TABLET | Refills: 3 | Status: SHIPPED | OUTPATIENT
Start: 2022-04-28 | End: 2022-08-08

## 2022-04-28 RX ORDER — LISINOPRIL 20 MG/1
20 TABLET ORAL DAILY
Qty: 90 TABLET | Refills: 1 | Status: SHIPPED | OUTPATIENT
Start: 2022-04-28

## 2022-04-28 RX ORDER — AMLODIPINE BESYLATE 5 MG/1
5 TABLET ORAL DAILY
Qty: 90 TABLET | Refills: 1 | Status: SHIPPED | OUTPATIENT
Start: 2022-04-28

## 2022-04-28 RX ORDER — BUPROPION HYDROCHLORIDE 150 MG/1
150 TABLET ORAL EVERY MORNING
Qty: 90 TABLET | Refills: 1 | Status: SHIPPED | OUTPATIENT
Start: 2022-04-28

## 2022-04-28 RX ORDER — VENLAFAXINE HYDROCHLORIDE 150 MG/1
150 CAPSULE, EXTENDED RELEASE ORAL DAILY
Qty: 60 CAPSULE | Refills: 2 | Status: SHIPPED | OUTPATIENT
Start: 2022-04-28

## 2022-04-28 RX ORDER — ATORVASTATIN CALCIUM 40 MG/1
40 TABLET, FILM COATED ORAL DAILY
Qty: 90 TABLET | Refills: 1 | Status: SHIPPED | OUTPATIENT
Start: 2022-04-28

## 2022-08-08 ENCOUNTER — TELEPHONE (OUTPATIENT)
Dept: FAMILY MEDICINE CLINIC | Facility: CLINIC | Age: 57
End: 2022-08-08

## 2022-08-08 DIAGNOSIS — F32.A ANXIETY AND DEPRESSION: ICD-10-CM

## 2022-08-08 DIAGNOSIS — U07.1 COVID-19: Primary | ICD-10-CM

## 2022-08-08 DIAGNOSIS — F41.9 ANXIETY AND DEPRESSION: ICD-10-CM

## 2022-08-08 RX ORDER — HYDROXYZINE HYDROCHLORIDE 25 MG/1
TABLET, FILM COATED ORAL
Qty: 90 TABLET | Refills: 3 | Status: SHIPPED | OUTPATIENT
Start: 2022-08-08 | End: 2022-10-24

## 2022-08-08 NOTE — TELEPHONE ENCOUNTER
Mike Coy called, she is Covid positive and wondering if you can prescribe her an antiviral?  She also requested that I ask you to call her

## 2022-08-09 NOTE — PROGRESS NOTES
COVID-19 Outpatient Progress Note    Assessment/Plan:    Problem List Items Addressed This Visit    None     Visit Diagnoses     COVID-19    -  Primary    Relevant Medications    molnupiravir 200 mg capsule    molnupiravir 200 mg capsule         COVID-19 Plan   Encounter provider Maria Esther Ma MD    Provider located at [address] [facility] [address] [phone]    Recent Visits  No visits were found meeting these conditions  Showing recent visits within past 7 days and meeting all other requirements  Today's Visits  Date Type Provider Dept   08/08/22 Telephone Madhu Cyr [address] [place] [address]   Showing today's visits and meeting all other requirements  Future Appointments  No visits were found meeting these conditions    Showing future appointments within next 150 days and meeting all other requirements     COVID-19 HPI  Lab Results   Component Value Date    SARSCOV2 Positive (A) 12/16/2020     Past Medical History:   Diagnosis Date    Anxiety     Depression     Hypertension     St[address] [address] ) 01/2019    left sided weakness     Past Surgical History:   Procedure Laterality Date    CHOLECYSTECTOMY      HAND DEBRIDEMENT Right 12/1/2018    Procedure: DEBRIDEMENT HAND/FINGER ([address]) WITH RAY AMPUTATION OF RIGHT INDEX FINGER;  Surgeon: Aisha Justice DO;  Location: [facility];  Service: Orthopedics     Current Outpatient Medications   Medication Sig Dispense Refill    molnupiravir 200 mg capsule Take 4 capsules (800 mg total) by mouth every 12 (twelve) hours for 5 days 40 capsule 0    amLODIPine (NORVASC) 5 mg tablet Take 1 tablet (5 mg total) by mouth daily 90 tablet 1    aspirin (ECOTRIN) 325 mg EC tablet Take 325 mg by mouth daily      atorvastatin (LIPITOR) 40 mg tablet Take 1 tablet (40 mg total) by mouth daily 90 tablet 1    buPROPion (WELLBUTRIN XL) 150 mg 24 hr tablet Take 1 tablet (150 mg total) by mouth every morning 90 tablet 1    hydrOXYzine HCL (ATARAX) 25 mg tablet TAKE 1 TABLET BY MOUTH EVERY 6 HOURS AS NEEDED FOR ITCHING  90 tablet 3    lisinopril (ZESTRIL) 20 mg tablet Take 1 tablet (20 mg total) by mouth daily 90 tablet 1    molnupiravir 200 mg capsule Take 4 capsules (800 mg total) by mouth every 12 (twelve) hours for 5 days 40 capsule 0    venlafaxine (EFFEXOR-XR) 150 mg 24 hr capsule Take 1 capsule (150 mg total) by mouth daily 60 capsule 2     No current facility-administered medications for this visit  Allergies   Allergen Reactions    Bactrim [Sulfamethoxazole-Trimethoprim] Hives       Review of Systems  Objective: There were no vitals filed for this visit  Physical Exam    VIRTUAL VISIT DISCLAIMER    Lee Ann Orr verbally agrees to participate in [employer]  Pt is aware that [employer] could be limited without vital signs or the ability to perform a full hands-on physical exam  Delaney Puente understands she or the provider may request at any time to terminate the video visit and request the patient to seek care or treatment in person

## 2022-08-10 NOTE — TELEPHONE ENCOUNTER
Pt doing beter with COVID and will break quarantine on the 14th  Pt  also with ear discomfort from wax and is using debrox  She will schedule an OV after quarantine to check her ear

## 2022-08-18 ENCOUNTER — OFFICE VISIT (OUTPATIENT)
Dept: URGENT CARE | Facility: MEDICAL CENTER | Age: 57
End: 2022-08-18
Payer: COMMERCIAL

## 2022-08-18 ENCOUNTER — HOSPITAL ENCOUNTER (OUTPATIENT)
Dept: RADIOLOGY | Facility: MEDICAL CENTER | Age: 57
Discharge: HOME | End: 2022-08-18
Payer: COMMERCIAL

## 2022-08-18 VITALS — HEIGHT: 59 IN | BODY MASS INDEX: 35.28 KG/M2 | WEIGHT: 175 LBS

## 2022-08-18 VITALS
DIASTOLIC BLOOD PRESSURE: 96 MMHG | BODY MASS INDEX: 34.68 KG/M2 | WEIGHT: 172 LBS | HEART RATE: 82 BPM | SYSTOLIC BLOOD PRESSURE: 123 MMHG | HEIGHT: 59 IN | OXYGEN SATURATION: 97 % | RESPIRATION RATE: 18 BRPM | TEMPERATURE: 97.2 F

## 2022-08-18 DIAGNOSIS — H61.22 IMPACTED CERUMEN OF LEFT EAR: Primary | ICD-10-CM

## 2022-08-18 DIAGNOSIS — Z12.31 SCREENING MAMMOGRAM FOR HIGH-RISK PATIENT: ICD-10-CM

## 2022-08-18 PROCEDURE — 69210 REMOVE IMPACTED EAR WAX UNI: CPT | Performed by: PHYSICIAN ASSISTANT

## 2022-08-18 PROCEDURE — 99213 OFFICE O/P EST LOW 20 MIN: CPT | Performed by: PHYSICIAN ASSISTANT

## 2022-08-18 PROCEDURE — 77067 SCR MAMMO BI INCL CAD: CPT

## 2022-08-18 PROCEDURE — 77063 BREAST TOMOSYNTHESIS BI: CPT

## 2022-08-18 NOTE — PROGRESS NOTES
[facility] Now        NAME: Lissa Pak is a 65 y o  female  : 1965    MRN: [id number]  DATE: 2022  TIME: 5:50 PM    Assessment and Plan   Impacted cerumen of left ear [H61 22]  1  Impacted cerumen of left ear  Ear cerumen removal         Patient Instructions     1  Keep ear canal clean and dry  2  Use Debrox or Hydrogen peroxide flushes as needed for wax removal  3  Follow-up with PCP in 3-5 days  Chief Complaint     Chief Complaint   Patient presents with    Ear Fullness     Left ear for about a week  History of Present Illness       Richie Vera is a 50-year-old female presents with left-sided ear fullness and decreased hearing x3 days  Patient denies any ear drainage or fever, she has sensation in her ear is blocked  Patient has a prior history of cerumen impactions  Ear Fullness   Associated symptoms include hearing loss  Pertinent negatives include no ear discharge  Review of Systems   Review of Systems   Constitutional: Negative  HENT: Positive for hearing loss  Negative for ear discharge and ear pain  Respiratory: Negative  Gastrointestinal: Negative            Current Medications       Current Outpatient Medications:     amLODIPine (NORVASC) 5 mg tablet, Take 1 tablet (5 mg total) by mouth daily, Disp: 90 tablet, Rfl: 1    aspirin (ECOTRIN) 325 mg EC tablet, Take 325 mg by mouth daily, Disp: , Rfl:     atorvastatin (LIPITOR) 40 mg tablet, Take 1 tablet (40 mg total) by mouth daily, Disp: 90 tablet, Rfl: 1    buPROPion (WELLBUTRIN XL) 150 mg 24 hr tablet, Take 1 tablet (150 mg total) by mouth every morning, Disp: 90 tablet, Rfl: 1    hydrOXYzine HCL (ATARAX) 25 mg tablet, TAKE 1 TABLET BY MOUTH EVERY 6 HOURS AS NEEDED FOR ITCHING , Disp: 90 tablet, Rfl: 3    lisinopril (ZESTRIL) 20 mg tablet, Take 1 tablet (20 mg total) by mouth daily, Disp: 90 tablet, Rfl: 1    venlafaxine (EFFEXOR-XR) 150 mg 24 hr capsule, Take 1 capsule (150 mg total) by mouth daily, Disp: 60 capsule, Rfl: 2    Current Allergies     Allergies as of 08/18/2022 - Reviewed 08/18/2022   Allergen Reaction Noted    Bactrim [sulfamethoxazole-trimethoprim] Hives 11/30/2018            The following portions of the patient's history were reviewed and updated as appropriate: allergies, current medications, past family history, past medical history, past social history, past surgical history and problem list      Past Medical History:   Diagnosis Date    Anxiety     Depression     Hypertension     Stroke ([address] ) 01/2019    left sided weakness       Past Surgical History:   Procedure Laterality Date    CHOLECYSTECTOMY      HAND DEBRIDEMENT Right 12/1/2018    Procedure: DEBRIDEMENT HAND/FINGER ([address]) WITH RAY AMPUTATION OF RIGHT INDEX FINGER;  Surgeon: Yaa Barton DO;  Location: [facility];  Service: Orthopedics       Family History   Problem Relation Age of Onset    No Known Problems Mother     No Known Problems Father     No Known Problems Sister     Diabetes Maternal Grandmother     No Known Problems Maternal Grandfather     No Known Problems Paternal Grandmother     No Known Problems Paternal Grandfather     No Known Problems Brother     No Known Problems Son     No Known Problems Son     No Known Problems Son          Medications have been verified  Objective   /96   Pulse 82   Temp (!) 97 2 °F (36 2 °C)   Resp 18   Ht 4' 11" (1 499 m)   Wt 78 kg (172 lb)   SpO2 97%   BMI 34 74 kg/m²   No LMP recorded  Patient is postmenopausal        Physical Exam     Physical Exam  Constitutional:       General: She is not in acute distress  Appearance: Normal appearance  She is not ill-appearing  HENT:      Head: Normocephalic and atraumatic  Right Ear: Tympanic membrane and ear canal normal       Ears:      Comments: Left external auditory canal completely occluded and cerumen, right normal     Nose: Nose normal       Mouth/Throat:      Lips: Pink  Pharynx: Oropharynx is clear  Cardiovascular:      Rate and Rhythm: Normal rate and regular rhythm  Heart sounds: No murmur heard  Pulmonary:      Effort: Pulmonary effort is normal       Breath sounds: Normal breath sounds  Neurological:      Mental Status: She is alert  Ear cerumen removal    Date/Time: 8/18/2022 5:48 PM  Performed by: Jennifer Tam PA-C  Authorized by: Jennifer Tam PA-C   Universal Protocol:  Consent given by: patient  Patient understanding: patient states understanding of the procedure being performed  Patient consent: the patient's understanding of the procedure matches consent given      Procedure details:     Location:  L ear    Procedure type: irrigation with instrumentation      Instrumentation: curette    Post-procedure details:     Patient tolerance of procedure:   Tolerated well, no immediate complications

## 2022-08-18 NOTE — PATIENT INSTRUCTIONS
1  Keep ear canal clean and dry  2  Use Debrox or Hydrogen peroxide flushes as needed for wax removal  3  Follow-up with PCP in 3-5 days

## 2022-08-29 LAB
ALBUMIN SERPL-MCNC: 4 G/DL (ref 3.6–5.1)
ALBUMIN/GLOB SERPL: 1.5 (CALC) (ref 1–2.5)
ALP SERPL-CCNC: 117 U/L (ref 37–153)
ALT SERPL-CCNC: 9 U/L (ref 6–29)
AST SERPL-CCNC: 11 U/L (ref 10–35)
BASOPHILS # BLD AUTO: 23 CELLS/UL (ref 0–200)
BASOPHILS NFR BLD AUTO: 0.3 %
BILIRUB SERPL-MCNC: 0.4 MG/DL (ref 0.2–1.2)
BUN SERPL-MCNC: 10 MG/DL (ref 7–25)
BUN/CREAT SERPL: NORMAL (CALC) (ref 6–22)
CALCIUM SERPL-MCNC: 9.3 MG/DL (ref 8.6–10.4)
CHLORIDE SERPL-SCNC: 105 MMOL/L (ref 98–110)
CHOLEST SERPL-MCNC: 149 MG/DL
CHOLEST/HDLC SERPL: 3.2 (CALC)
CO2 SERPL-SCNC: 27 MMOL/L (ref 20–32)
CREAT SERPL-MCNC: 0.96 MG/DL (ref 0.5–1.03)
EOSINOPHIL # BLD AUTO: 211 CELLS/UL (ref 15–500)
EOSINOPHIL NFR BLD AUTO: 2.7 %
ERYTHROCYTE [DISTWIDTH] IN BLOOD BY AUTOMATED COUNT: 12.8 % (ref 11–15)
GFR/BSA.PRED SERPLBLD CYS-BASED-ARV: 69 ML/MIN/1.73M2
GLOBULIN SER CALC-MCNC: 2.6 G/DL (CALC) (ref 1.9–3.7)
GLUCOSE SERPL-MCNC: 97 MG/DL (ref 65–99)
HCT VFR BLD AUTO: 37 % (ref 35–45)
HCV AB S/CO SERPL IA: 0.06
HCV AB SERPL QL IA: NORMAL
HDLC SERPL-MCNC: 46 MG/DL
HGB BLD-MCNC: 12.3 G/DL (ref 11.7–15.5)
LDLC SERPL CALC-MCNC: 79 MG/DL (CALC)
LYMPHOCYTES # BLD AUTO: 1708 CELLS/UL (ref 850–3900)
LYMPHOCYTES NFR BLD AUTO: 21.9 %
MAGNESIUM SERPL-MCNC: 2.3 MG/DL (ref 1.5–2.5)
MCH RBC QN AUTO: 28.5 PG (ref 27–33)
MCHC RBC AUTO-ENTMCNC: 33.2 G/DL (ref 32–36)
MCV RBC AUTO: 85.6 FL (ref 80–100)
MONOCYTES # BLD AUTO: 406 CELLS/UL (ref 200–950)
MONOCYTES NFR BLD AUTO: 5.2 %
NEUTROPHILS # BLD AUTO: 5452 CELLS/UL (ref 1500–7800)
NEUTROPHILS NFR BLD AUTO: 69.9 %
NONHDLC SERPL-MCNC: 103 MG/DL (CALC)
PLATELET # BLD AUTO: 237 THOUSAND/UL (ref 140–400)
PMV BLD REES-ECKER: 10.1 FL (ref 7.5–12.5)
POTASSIUM SERPL-SCNC: 3.9 MMOL/L (ref 3.5–5.3)
PROT SERPL-MCNC: 6.6 G/DL (ref 6.1–8.1)
RBC # BLD AUTO: 4.32 MILLION/UL (ref 3.8–5.1)
SODIUM SERPL-SCNC: 141 MMOL/L (ref 135–146)
T4 FREE SERPL-MCNC: 1.1 NG/DL (ref 0.8–1.8)
TRIGL SERPL-MCNC: 142 MG/DL
TSH SERPL-ACNC: 1.39 MIU/L (ref 0.4–4.5)
URATE SERPL-MCNC: 5.1 MG/DL (ref 2.5–7)
WBC # BLD AUTO: 7.8 THOUSAND/UL (ref 3.8–10.8)

## 2022-09-09 ENCOUNTER — OFFICE VISIT (OUTPATIENT)
Dept: FAMILY MEDICINE CLINIC | Facility: CLINIC | Age: 57
End: 2022-09-09
Payer: COMMERCIAL

## 2022-09-09 VITALS
BODY MASS INDEX: 35.08 KG/M2 | HEART RATE: 69 BPM | TEMPERATURE: 97.6 F | WEIGHT: 174 LBS | OXYGEN SATURATION: 97 % | DIASTOLIC BLOOD PRESSURE: 60 MMHG | HEIGHT: 59 IN | SYSTOLIC BLOOD PRESSURE: 110 MMHG

## 2022-09-09 DIAGNOSIS — E78.2 MIXED HYPERLIPIDEMIA: ICD-10-CM

## 2022-09-09 DIAGNOSIS — I63.449 CEREBROVASCULAR ACCIDENT (CVA) DUE TO EMBOLISM OF CEREBELLAR ARTERY, UNSPECIFIED BLOOD VESSEL LATERALITY (HCC): ICD-10-CM

## 2022-09-09 DIAGNOSIS — Z72.0 TOBACCO USE: ICD-10-CM

## 2022-09-09 DIAGNOSIS — I10 ESSENTIAL HYPERTENSION: Primary | ICD-10-CM

## 2022-09-09 PROCEDURE — 99214 OFFICE O/P EST MOD 30 MIN: CPT | Performed by: FAMILY MEDICINE

## 2022-09-09 NOTE — PROGRESS NOTES
Depression Screening and Follow-up Plan: Patient assessed for underlying major depression  Brief counseling provided and recommend additional follow-up/re-evaluation next office visit  Patient advised to follow-up with PCP for further management  Assessment/Plan:         Problem List Items Addressed This Visit        Cardiovascular and Mediastinum    Essential hypertension - Primary     Patient is stable with current anti-hypertensive medicine and continue to follow a low sodium diet and take current medication  All questions about this condition were answered today  Stroke [facility])     Patient is stable  and will continue present plan of care and reassess at next routine visit  All questions about this problem from patient were answered today  Other    Tobacco use     Patient encouraged to quit using tobacco for that increases their risk for COPD, lung cancer,stroke, oral cancer and heart disease  If patient does not want to quit they should let me know  when they are interested in quitting  There are numerous options to use to quit and we can discuss them  Mixed hyperlipidemia     Patient  is stable with current medication and we discussed a low fat low cholesterol diet  Weight loss also discussed for this will help lower cholesterol also  Recheck lipids in 6 months  Relevant Orders    Comprehensive metabolic panel    Lipid Panel with Direct LDL reflex            Subjective:      Patient ID: Lissa Pak is a 65 y o  female  This 71-year-old female here today for checkup on multiple medical problems her patient history of hypertension history of CVA also and some anxiety some depression as well as tobacco use  Patient doing well with her medication her lab work was done and reviewed today  Patient does not need refills this point but when she needs refills she will call us and we can you refill meds appropriate    Discussed with patient her need to continue going to [facility] she is alcohol-free 2 years  This is a very good thing for her  I encouraged her to can continue good her a meetings because that is due a good thing for her  I also discussed with patient her need to trying quit smoking  The following portions of the patient's history were reviewed and updated as appropriate:   Past Medical History:  She has a past medical history of Anxiety, Depression, Hypertension, and Stroke ([address] ) (01/2019)  ,  _______________________________________________________________________  Medical Problems:  does not have any pertinent problems on file ,  _______________________________________________________________________  Past Surgical History:   has a past surgical history that includes Cholecystectomy and Hand Debridement (Right, 12/1/2018)  ,  _______________________________________________________________________  Family History:  family history includes Diabetes in her maternal grandmother; No Known Problems in her brother, father, maternal grandfather, mother, paternal grandfather, paternal grandmother, sister, son, son, and son ,  _______________________________________________________________________  Social History:   reports that she has been smoking cigarettes  She has a 5 00 pack-year smoking history  She has never used smokeless tobacco  She reports previous alcohol use  She reports that she does not use drugs  ,  _______________________________________________________________________  Allergies:  is allergic to bactrim [sulfamethoxazole-trimethoprim]     _______________________________________________________________________  Current Outpatient Medications   Medication Sig Dispense Refill    amLODIPine (NORVASC) 5 mg tablet Take 1 tablet (5 mg total) by mouth daily 90 tablet 1    aspirin (ECOTRIN) 325 mg EC tablet Take 325 mg by mouth daily      atorvastatin (LIPITOR) 40 mg tablet Take 1 tablet (40 mg total) by mouth daily 90 tablet 1    buPROPion (WELLBUTRIN XL) 150 mg 24 hr tablet Take 1 tablet (150 mg total) by mouth every morning 90 tablet 1    hydrOXYzine HCL (ATARAX) 25 mg tablet TAKE 1 TABLET BY MOUTH EVERY 6 HOURS AS NEEDED FOR ITCHING  90 tablet 3    lisinopril (ZESTRIL) 20 mg tablet Take 1 tablet (20 mg total) by mouth daily 90 tablet 1    venlafaxine (EFFEXOR-XR) 150 mg 24 hr capsule Take 1 capsule (150 mg total) by mouth daily 60 capsule 2     No current facility-administered medications for this visit      _______________________________________________________________________  Review of Systems   Constitutional: Negative for activity change, appetite change, fatigue and fever  HENT: Negative for congestion, ear pain, postnasal drip, rhinorrhea, sinus pressure, sinus pain, sneezing and sore throat  Eyes: Negative for pain and redness  Respiratory: Negative for apnea, cough, chest tightness, shortness of breath and wheezing  Cardiovascular: Negative for chest pain, palpitations and leg swelling  Gastrointestinal: Negative for abdominal pain, constipation, diarrhea, nausea and vomiting  Endocrine: Negative for cold intolerance and heat intolerance  Genitourinary: Negative for difficulty urinating, dysuria, frequency, hematuria and urgency  Musculoskeletal: Positive for gait problem  Negative for arthralgias, back pain and myalgias  Skin: Negative for rash  Neurological: Positive for weakness  Negative for dizziness, speech difficulty, numbness and headaches  Hematological: Does not bruise/bleed easily  Psychiatric/Behavioral: Negative for agitation, confusion and hallucinations  Objective:  Vitals:    09/09/22 1603   BP: 110/60   BP Location: Left arm   Patient Position: Sitting   Cuff Size: Large   Pulse: 69   Temp: 97 6 °F (36 4 °C)   SpO2: 97%   Weight: 78 9 kg (174 lb)   Height: 4' 11" (1 499 m)     Body mass index is 35 14 kg/m²  Physical Exam  Vitals and nursing note reviewed     Constitutional:       Appearance: She is well-developed  HENT:      Head: Normocephalic and atraumatic  Nose: Nose normal    Eyes:      General: No scleral icterus  Conjunctiva/sclera: Conjunctivae normal       Pupils: Pupils are equal, round, and reactive to light  Neck:      Thyroid: No thyromegaly  Cardiovascular:      Rate and Rhythm: Normal rate and regular rhythm  Pulmonary:      Effort: Pulmonary effort is normal       Breath sounds: Normal breath sounds  No wheezing  Abdominal:      General: Bowel sounds are normal  There is no distension  Palpations: Abdomen is soft  Tenderness: There is no abdominal tenderness  There is no guarding or rebound  Musculoskeletal:         General: No tenderness or deformity  Normal range of motion  Cervical back: Normal range of motion and neck supple  Skin:     General: Skin is warm and dry  Findings: No erythema or rash  Neurological:      Mental Status: She is alert and oriented to person, place, and time  Sensory: No sensory deficit  Motor: Weakness present  Gait: Gait abnormal    Psychiatric:         Behavior: Behavior normal          Thought Content:  Thought content normal          Judgment: Judgment normal

## 2022-09-14 ENCOUNTER — VBI (OUTPATIENT)
Dept: ADMINISTRATIVE | Facility: OTHER | Age: 57
End: 2022-09-14

## 2022-10-11 DIAGNOSIS — F32.A DEPRESSION, UNSPECIFIED DEPRESSION TYPE: ICD-10-CM

## 2022-10-11 RX ORDER — VENLAFAXINE HYDROCHLORIDE 150 MG/1
CAPSULE, EXTENDED RELEASE ORAL
Qty: 30 CAPSULE | Refills: 5 | Status: SHIPPED | OUTPATIENT
Start: 2022-10-11

## 2022-10-19 DIAGNOSIS — F32.A DEPRESSION, UNSPECIFIED DEPRESSION TYPE: ICD-10-CM

## 2022-10-20 DIAGNOSIS — I10 ESSENTIAL HYPERTENSION: ICD-10-CM

## 2022-10-20 RX ORDER — LISINOPRIL 20 MG/1
TABLET ORAL
Qty: 90 TABLET | Refills: 1 | Status: SHIPPED | OUTPATIENT
Start: 2022-10-20

## 2022-10-20 RX ORDER — BUPROPION HYDROCHLORIDE 150 MG/1
TABLET ORAL
Qty: 90 TABLET | Refills: 1 | Status: SHIPPED | OUTPATIENT
Start: 2022-10-20

## 2022-10-22 DIAGNOSIS — F41.9 ANXIETY AND DEPRESSION: ICD-10-CM

## 2022-10-22 DIAGNOSIS — I10 ESSENTIAL (PRIMARY) HYPERTENSION: ICD-10-CM

## 2022-10-22 DIAGNOSIS — F32.A ANXIETY AND DEPRESSION: ICD-10-CM

## 2022-10-24 RX ORDER — AMLODIPINE BESYLATE 5 MG/1
5 TABLET ORAL DAILY
Qty: 90 TABLET | Refills: 1 | Status: SHIPPED | OUTPATIENT
Start: 2022-10-24

## 2022-10-24 RX ORDER — HYDROXYZINE HYDROCHLORIDE 25 MG/1
TABLET, FILM COATED ORAL
Qty: 90 TABLET | Refills: 3 | Status: SHIPPED | OUTPATIENT
Start: 2022-10-24

## 2022-11-25 DIAGNOSIS — E78.5 HYPERLIPIDEMIA, UNSPECIFIED: ICD-10-CM

## 2022-11-25 RX ORDER — ATORVASTATIN CALCIUM 40 MG/1
TABLET, FILM COATED ORAL
Qty: 90 TABLET | Refills: 1 | Status: SHIPPED | OUTPATIENT
Start: 2022-11-25

## 2023-01-07 DIAGNOSIS — F32.A ANXIETY AND DEPRESSION: ICD-10-CM

## 2023-01-07 DIAGNOSIS — F41.9 ANXIETY AND DEPRESSION: ICD-10-CM

## 2023-01-07 RX ORDER — HYDROXYZINE HYDROCHLORIDE 25 MG/1
TABLET, FILM COATED ORAL
Qty: 90 TABLET | Refills: 3 | Status: SHIPPED | OUTPATIENT
Start: 2023-01-07

## 2023-02-14 ENCOUNTER — VBI (OUTPATIENT)
Dept: ADMINISTRATIVE | Facility: OTHER | Age: 58
End: 2023-02-14

## 2023-03-10 ENCOUNTER — OFFICE VISIT (OUTPATIENT)
Dept: FAMILY MEDICINE CLINIC | Facility: CLINIC | Age: 58
End: 2023-03-10

## 2023-03-10 VITALS
WEIGHT: 184 LBS | OXYGEN SATURATION: 98 % | HEART RATE: 64 BPM | TEMPERATURE: 97.4 F | HEIGHT: 59 IN | DIASTOLIC BLOOD PRESSURE: 76 MMHG | SYSTOLIC BLOOD PRESSURE: 120 MMHG | BODY MASS INDEX: 37.09 KG/M2 | RESPIRATION RATE: 18 BRPM

## 2023-03-10 DIAGNOSIS — Z72.0 TOBACCO USE: Primary | ICD-10-CM

## 2023-03-10 DIAGNOSIS — F41.9 ANXIETY AND DEPRESSION: ICD-10-CM

## 2023-03-10 DIAGNOSIS — F32.A DEPRESSION, UNSPECIFIED DEPRESSION TYPE: ICD-10-CM

## 2023-03-10 DIAGNOSIS — I10 ESSENTIAL HYPERTENSION: ICD-10-CM

## 2023-03-10 DIAGNOSIS — E78.2 MIXED HYPERLIPIDEMIA: ICD-10-CM

## 2023-03-10 DIAGNOSIS — I63.449 CEREBROVASCULAR ACCIDENT (CVA) DUE TO EMBOLISM OF CEREBELLAR ARTERY, UNSPECIFIED BLOOD VESSEL LATERALITY (HCC): ICD-10-CM

## 2023-03-10 DIAGNOSIS — F32.A ANXIETY AND DEPRESSION: ICD-10-CM

## 2023-03-10 RX ORDER — BUPROPION HYDROCHLORIDE 150 MG/1
150 TABLET ORAL EVERY MORNING
Qty: 90 TABLET | Refills: 1 | Status: SHIPPED | OUTPATIENT
Start: 2023-03-10

## 2023-03-10 NOTE — PROGRESS NOTES
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, unspecified blood vessel laterality [facility])  Assessment & Plan:  Patient is stable  and will continue present plan of care and reassess at next routine visit  All questions about this problem from patient were answered today  3  Mixed hyperlipidemia  Assessment & Plan:  Patient  is stable with current medication and we discussed a low fat low cholesterol diet  Weight loss also discussed for this will help lower cholesterol also  Recheck lipids in 6 months  4  Essential hypertension  Assessment & Plan:  Patient is stable with current anti-hypertensive medicine and continue to follow a low sodium diet and take current medication  All questions about this condition were answered today  5  Anxiety and depression  Assessment & Plan:  Patient to continue utilizing medical therapy as well as counseling sources as applicable to condition  If suicidal thought or fear of suicide attempt, to call 911 and seek help immediately  Medications and therapy reviewed today and all patient  questions answered today  6  Depression, unspecified depression type  -     buPROPion (WELLBUTRIN XL) 150 mg 24 hr tablet; Take 1 tablet (150 mg total) by mouth every morning      BMI Counseling: There is no height or weight on file to calculate BMI   The BMI is above normal  Nutrition recommendations include decreasing portion sizes, encouraging healthy choices of fruits and vegetables, decreasing fast food intake, consuming healthier snacks, limiting drinks that contain sugar, moderation in carbohydrate intake, increasing intake of lean protein, reducing intake of saturated and trans fat and reducing intake of cholesterol  Exercise recommendations include exercising 3-5 times per week  No pharmacotherapy was ordered  Patient referred to PCP  Rationale for BMI follow-up plan is due to patient being overweight or obese  Depression Screening and Follow-up Plan: Patient assessed for underlying major depression  Brief counseling provided and recommend additional follow-up/re-evaluation next office visit  Patient advised to follow-up with PCP for further management  Tobacco Cessation Counseling: Tobacco cessation counseling was provided  The patient is sincerely urged to quit consumption of tobacco  She is ready to quit tobacco  Medication options and side effects of medication discussed  Patient agreed to medication  Subjective     Is a 41-year-old female here today for checkup on multiple medical problems patient with history of CVA also patient with some hypertension history of depression anxiety and some fibromyalgia  Patient had also lost a finger on her right hand is doing well  Patient is down to 5 to 10 cigarettes a day I have talked her about trying to quit smoking altogether or if not just keep trying to trim it down  Patient is doing well with her medications  She is due for cholesterol lab test and I will order that for her for her next visit  Patient is refills on her Wellbutrin I will call in for her today  Review of Systems   Constitutional: Negative for activity change, appetite change, fatigue and fever  HENT: Negative for congestion, ear pain, postnasal drip, rhinorrhea, sinus pressure, sinus pain, sneezing and sore throat  Eyes: Negative for pain and redness     Respiratory: Negative for apnea, cough, chest tightness, shortness of breath and wheezing  Cardiovascular: Negative for chest pain, palpitations and leg swelling  Gastrointestinal: Negative for abdominal pain, constipation, diarrhea, nausea and vomiting  Endocrine: Negative for cold intolerance and heat intolerance  Genitourinary: Negative for difficulty urinating, dysuria, frequency, hematuria and urgency  Musculoskeletal: Negative for arthralgias, back pain, gait problem and myalgias  Skin: Negative for rash  Neurological: Negative for dizziness, speech difficulty, weakness, numbness and headaches  Hematological: Does not bruise/bleed easily  Psychiatric/Behavioral: Negative for agitation, confusion and hallucinations         Past Medical History:   Diagnosis Date   • Anxiety    • Depression    • Hypertension    • Stroke ([facility] [address] ) 01/2019    left sided weakness     Past Surgical History:   Procedure Laterality Date   • CHOLECYSTECTOMY     • HAND DEBRIDEMENT Right 12/1/2018    Procedure: DEBRIDEMENT HAND/FINGER [facility] OUT) WITH RAY AMPUTATION OF RIGHT INDEX FINGER;  Surgeon: Sobia Bustamante DO;  Location: [facility];  Service: Orthopedics     Family History   Problem Relation Age of Onset   • No Known Problems Mother    • No Known Problems Father    • No Known Problems Sister    • Diabetes Maternal Grandmother    • No Known Problems Maternal Grandfather    • No Known Problems Paternal Grandmother    • No Known Problems Paternal Grandfather    • No Known Problems Brother    • No Known Problems Son    • No Known Problems Son    • No Known Problems Son      Social History     Socioeconomic History   • Marital status: /Civil Union     Spouse name: None   • Number of children: None   • Years of education: 12   • Highest education level: None   Occupational History   • None   Tobacco Use   • Smoking status: Every Day     Packs/day: 0 25     Years: 20 00     Pack years: 5 00     Types: Cigarettes   • Smokeless tobacco: Never   • Tobacco comments:     6 CIGARRETTES A DAY   Vaping Use   • Vaping Use: Former   • Substances: Nicotine, Flavoring   Substance and Sexual Activity   • Alcohol use: Not Currently   • Drug use: No   • Sexual activity: None   Other Topics Concern   • None   Social History Narrative    · Most recent tobacco use screenin2019      · Do you currently or have you served in the [employer]:   No      · Were you activated, into active duty, as a member of the [employer] or as a Reservist:   No     · Exercise level:   Occasional      · Diet:   Cardiac  as much as possible     · General stress level:   High      · Caffeine intake:   Heavy      · Seat belts used routinely:   Yes      · Sunscreen used routinely:   Yes      · Smoke alarm in home: Yes     · Salt Intake:   none      · Has the Patient had a mammogram to screen for breast cancer within 24 months:   No          Social Determinants of Health     Financial Resource Strain: Not on file   Food Insecurity: Not on file   Transportation Needs: Not on file   Physical Activity: Not on file   Stress: Not on file   Social Connections: Not on file   Intimate Partner Violence: Not on file   Housing Stability: Not on file     Current Outpatient Medications on File Prior to Visit   Medication Sig   • amLODIPine (NORVASC) 5 mg tablet TAKE 1 TABLET (5 MG TOTAL) BY MOUTH DAILY  • aspirin (ECOTRIN) 325 mg EC tablet Take 325 mg by mouth daily   • atorvastatin (LIPITOR) 40 mg tablet TAKE 1 TABLET BY MOUTH EVERY DAY   • hydrOXYzine HCL (ATARAX) 25 mg tablet TAKE 1 TABLET BY MOUTH EVERY 6 HOURS AS NEEDED FOR ITCHING     • lisinopril (ZESTRIL) 20 mg tablet TAKE 1 TABLET BY MOUTH EVERY DAY   • venlafaxine (EFFEXOR-XR) 150 mg 24 hr capsule TAKE 1 CAPSULE BY MOUTH EVERY DAY   • [DISCONTINUED] buPROPion (WELLBUTRIN XL) 150 mg 24 hr tablet TAKE 1 TABLET BY MOUTH EVERY DAY IN THE MORNING     Allergies   Allergen Reactions   • Bactrim [Sulfamethoxazole-Trimethoprim] Hives Immunization History   Administered Date(s) Administered   • COVID-19 MODERNA VACC 0 5 ML IM 04/05/2021, 05/06/2021   • INFLUENZA 02/08/2018, 09/28/2018   • Influenza, recombinant, quadrivalent,injectable, preservative free 12/04/2020   • Pneumococcal Polysaccharide PPV23 12/03/2018   • Tdap 11/22/2018       Objective     /76 (BP Location: Left arm, Patient Position: Sitting, Cuff Size: Standard)   Pulse 64   Temp (!) 97 4 °F (36 3 °C) (Temporal)   Resp 18   Ht 4' 11" (1 499 m)   Wt 83 5 kg (184 lb)   SpO2 98%   BMI 37 16 kg/m²     Physical Exam  Vitals and nursing note reviewed  Constitutional:       Appearance: She is well-developed  HENT:      Head: Normocephalic and atraumatic  Nose: Nose normal    Eyes:      General: No scleral icterus  Conjunctiva/sclera: Conjunctivae normal       Pupils: Pupils are equal, round, and reactive to light  Neck:      Thyroid: No thyromegaly  Pulmonary:      Effort: Pulmonary effort is normal       Breath sounds: Normal breath sounds  No wheezing  Abdominal:      General: Bowel sounds are normal  There is no distension  Palpations: Abdomen is soft  Tenderness: There is no abdominal tenderness  There is no guarding or rebound  Musculoskeletal:         General: No tenderness or deformity  Normal range of motion  Cervical back: Normal range of motion and neck supple  Skin:     General: Skin is warm and dry  Findings: No erythema or rash  Neurological:      Mental Status: She is alert and oriented to person, place, and time  Sensory: No sensory deficit  Psychiatric:         Behavior: Behavior normal          Thought Content:  Thought content normal          Judgment: Judgment normal        Jerome Burt MD

## 2023-03-21 DIAGNOSIS — F32.A DEPRESSION, UNSPECIFIED DEPRESSION TYPE: ICD-10-CM

## 2023-03-21 RX ORDER — VENLAFAXINE HYDROCHLORIDE 150 MG/1
CAPSULE, EXTENDED RELEASE ORAL
Qty: 30 CAPSULE | Refills: 5 | Status: SHIPPED | OUTPATIENT
Start: 2023-03-21

## 2023-03-26 DIAGNOSIS — F41.9 ANXIETY AND DEPRESSION: ICD-10-CM

## 2023-03-26 DIAGNOSIS — F32.A ANXIETY AND DEPRESSION: ICD-10-CM

## 2023-03-26 RX ORDER — HYDROXYZINE HYDROCHLORIDE 25 MG/1
TABLET, FILM COATED ORAL
Qty: 90 TABLET | Refills: 3 | Status: SHIPPED | OUTPATIENT
Start: 2023-03-26

## 2023-05-05 DIAGNOSIS — I10 ESSENTIAL HYPERTENSION: ICD-10-CM

## 2023-05-05 RX ORDER — LISINOPRIL 20 MG/1
TABLET ORAL
Qty: 90 TABLET | Refills: 1 | Status: SHIPPED | OUTPATIENT
Start: 2023-05-05

## 2023-05-17 DIAGNOSIS — E78.5 HYPERLIPIDEMIA, UNSPECIFIED: ICD-10-CM

## 2023-05-17 LAB
ALBUMIN SERPL-MCNC: 4.2 G/DL (ref 3.6–5.1)
ALBUMIN/GLOB SERPL: 1.6 (CALC) (ref 1–2.5)
ALP SERPL-CCNC: 107 U/L (ref 37–153)
ALT SERPL-CCNC: 16 U/L (ref 6–29)
AST SERPL-CCNC: 12 U/L (ref 10–35)
BILIRUB SERPL-MCNC: 0.4 MG/DL (ref 0.2–1.2)
BUN SERPL-MCNC: 13 MG/DL (ref 7–25)
BUN/CREAT SERPL: ABNORMAL (CALC) (ref 6–22)
CALCIUM SERPL-MCNC: 8.8 MG/DL (ref 8.6–10.4)
CHLORIDE SERPL-SCNC: 107 MMOL/L (ref 98–110)
CHOLEST SERPL-MCNC: 149 MG/DL
CHOLEST/HDLC SERPL: 3 (CALC)
CO2 SERPL-SCNC: 24 MMOL/L (ref 20–32)
CREAT SERPL-MCNC: 0.89 MG/DL (ref 0.5–1.03)
GFR/BSA.PRED SERPLBLD CYS-BASED-ARV: 76 ML/MIN/1.73M2
GLOBULIN SER CALC-MCNC: 2.6 G/DL (CALC) (ref 1.9–3.7)
GLUCOSE SERPL-MCNC: 110 MG/DL (ref 65–99)
HDLC SERPL-MCNC: 49 MG/DL
LDLC SERPL CALC-MCNC: 77 MG/DL (CALC)
NONHDLC SERPL-MCNC: 100 MG/DL (CALC)
POTASSIUM SERPL-SCNC: 4.6 MMOL/L (ref 3.5–5.3)
PROT SERPL-MCNC: 6.8 G/DL (ref 6.1–8.1)
SODIUM SERPL-SCNC: 141 MMOL/L (ref 135–146)
TRIGL SERPL-MCNC: 124 MG/DL

## 2023-05-17 RX ORDER — ATORVASTATIN CALCIUM 40 MG/1
TABLET, FILM COATED ORAL
Qty: 90 TABLET | Refills: 1 | Status: SHIPPED | OUTPATIENT
Start: 2023-05-17

## 2023-06-16 ENCOUNTER — OFFICE VISIT (OUTPATIENT)
Dept: FAMILY MEDICINE CLINIC | Facility: CLINIC | Age: 58
End: 2023-06-16
Payer: COMMERCIAL

## 2023-06-16 VITALS
SYSTOLIC BLOOD PRESSURE: 108 MMHG | HEIGHT: 59 IN | DIASTOLIC BLOOD PRESSURE: 80 MMHG | BODY MASS INDEX: 35.88 KG/M2 | HEART RATE: 74 BPM | OXYGEN SATURATION: 99 % | WEIGHT: 178 LBS | TEMPERATURE: 97.2 F

## 2023-06-16 DIAGNOSIS — F32.A ANXIETY AND DEPRESSION: ICD-10-CM

## 2023-06-16 DIAGNOSIS — E78.5 HYPERLIPIDEMIA, UNSPECIFIED: ICD-10-CM

## 2023-06-16 DIAGNOSIS — I10 ESSENTIAL HYPERTENSION: ICD-10-CM

## 2023-06-16 DIAGNOSIS — Z72.0 TOBACCO USE: ICD-10-CM

## 2023-06-16 DIAGNOSIS — F41.9 ANXIETY AND DEPRESSION: ICD-10-CM

## 2023-06-16 DIAGNOSIS — I10 ESSENTIAL (PRIMARY) HYPERTENSION: ICD-10-CM

## 2023-06-16 DIAGNOSIS — F32.A DEPRESSION, UNSPECIFIED DEPRESSION TYPE: ICD-10-CM

## 2023-06-16 DIAGNOSIS — Z86.73 H/O: STROKE: ICD-10-CM

## 2023-06-16 DIAGNOSIS — E78.2 MIXED HYPERLIPIDEMIA: ICD-10-CM

## 2023-06-16 DIAGNOSIS — Z00.00 WELL ADULT EXAM: Primary | ICD-10-CM

## 2023-06-16 PROCEDURE — 99396 PREV VISIT EST AGE 40-64: CPT | Performed by: FAMILY MEDICINE

## 2023-06-16 RX ORDER — HYDROXYZINE HYDROCHLORIDE 25 MG/1
25 TABLET, FILM COATED ORAL EVERY 6 HOURS PRN
Qty: 90 TABLET | Refills: 3 | Status: SHIPPED | OUTPATIENT
Start: 2023-06-16

## 2023-06-16 RX ORDER — AMLODIPINE BESYLATE 5 MG/1
5 TABLET ORAL DAILY
Qty: 90 TABLET | Refills: 1 | Status: SHIPPED | OUTPATIENT
Start: 2023-06-16

## 2023-06-16 RX ORDER — BUPROPION HYDROCHLORIDE 150 MG/1
150 TABLET ORAL EVERY MORNING
Qty: 90 TABLET | Refills: 1 | Status: SHIPPED | OUTPATIENT
Start: 2023-06-16

## 2023-06-16 RX ORDER — LISINOPRIL 20 MG/1
20 TABLET ORAL DAILY
Qty: 90 TABLET | Refills: 1 | Status: SHIPPED | OUTPATIENT
Start: 2023-06-16

## 2023-06-16 RX ORDER — VENLAFAXINE HYDROCHLORIDE 150 MG/1
150 CAPSULE, EXTENDED RELEASE ORAL DAILY
Qty: 90 CAPSULE | Refills: 1 | Status: SHIPPED | OUTPATIENT
Start: 2023-06-16

## 2023-06-16 RX ORDER — ATORVASTATIN CALCIUM 40 MG/1
40 TABLET, FILM COATED ORAL DAILY
Qty: 90 TABLET | Refills: 1 | Status: SHIPPED | OUTPATIENT
Start: 2023-06-16

## 2023-06-16 NOTE — PROGRESS NOTES
Name: Indio Escudero      : 1965      MRN: [id number]  Encounter Provider: Shalini Rees MD  Encounter Date: 2023   Encounter department: [address] [place] [address] Place     1  Well adult exam    2  H/O: stroke  Assessment & Plan:  Patient is stable  and will continue present plan of care and reassess at next routine visit  All questions about this problem from patient were answered today  3  Essential hypertension  Assessment & Plan:  Patient is stable with current anti-hypertensive medicine and continue to follow a low sodium diet and take current medication  All questions about this condition were answered today  Orders:  -     lisinopril (ZESTRIL) 20 mg tablet; Take 1 tablet (20 mg total) by mouth daily    4  Anxiety and depression  Assessment & Plan:  Patient is stable  and will continue present plan of care and reassess at next routine visit  All questions about this problem from patient were answered today  Orders:  -     hydrOXYzine HCL (ATARAX) 25 mg tablet; Take 1 tablet (25 mg total) by mouth every 6 (six) hours as needed for itching    5  Tobacco use  Assessment & Plan:  Patient encouraged to quit using tobacco for that increases their risk for COPD, lung cancer,stroke, oral cancer and heart disease  If patient does not want to quit they should let me know  when they are interested in quitting  There are numerous options to use to quit and we can discuss them  6  Mixed hyperlipidemia  Assessment & Plan:  Patient  is stable with current medication and we discussed a low fat low cholesterol diet  Weight loss also discussed for this will help lower cholesterol also  Recheck lipids in 6 months  7  Essential (primary) hypertension  -     amLODIPine (NORVASC) 5 mg tablet; Take 1 tablet (5 mg total) by mouth daily    8  Hyperlipidemia, unspecified  -     atorvastatin (LIPITOR) 40 mg tablet; Take 1 tablet (40 mg total) by mouth daily    9   Depression, unspecified depression type  -     buPROPion (WELLBUTRIN XL) 150 mg 24 hr tablet; Take 1 tablet (150 mg total) by mouth every morning  -     venlafaxine (EFFEXOR-XR) 150 mg 24 hr capsule; Take 1 capsule (150 mg total) by mouth daily        Depression Screening and Follow-up Plan: Patient assessed for underlying major depression  Brief counseling provided and recommend additional follow-up/re-evaluation next office visit  Patient advised to follow-up with PCP for further management  Subjective     Is a 52-year-old female here today for checkup on her yearly physical exam and multiple medical problems  Patient history of a 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 depression anxiety hypertension and hyperlipidemia  Patient had lab work reviewed and is doing very well cholesterol is well controlled and no liver problems  She is refills on all of her medicines and it was done for her today  We will see this patient back in approximately 3 months for recheck  Review of Systems   Constitutional: Negative for activity change, appetite change, fatigue and fever  HENT: Negative for congestion, ear pain, postnasal drip, rhinorrhea, sinus pressure, sinus pain, sneezing and sore throat  Eyes: Negative for pain and redness  Respiratory: Negative for apnea, cough, chest tightness, shortness of breath and wheezing  Cardiovascular: Negative for chest pain, palpitations and leg swelling  Gastrointestinal: Negative for abdominal pain, constipation, diarrhea, nausea and vomiting  Endocrine: Negative for cold intolerance and heat intolerance  Genitourinary: Negative for difficulty urinating, dysuria, frequency, hematuria and urgency  Musculoskeletal: Positive for gait problem  Negative for arthralgias, back pain and myalgias  Skin: Negative for rash  Neurological: Positive for weakness  Negative for dizziness, speech difficulty, numbness and headaches     Hematological: Does not bruise/bleed easily  Psychiatric/Behavioral: Negative for agitation, confusion and hallucinations         Past Medical History:   Diagnosis Date   • Anxiety    • Depression    • Hypertension    • Stroke ([facility] [address] ) 2019    left sided weakness     Past Surgical History:   Procedure Laterality Date   • CHOLECYSTECTOMY     • HAND DEBRIDEMENT Right 2018    Procedure: DEBRIDEMENT HAND/FINGER [facility] OUT) WITH RAY AMPUTATION OF RIGHT INDEX FINGER;  Surgeon: Carmen Davenport DO;  Location: [facility];  Service: Orthopedics     Family History   Problem Relation Age of Onset   • No Known Problems Mother    • No Known Problems Father    • No Known Problems Sister    • Diabetes Maternal Grandmother    • No Known Problems Maternal Grandfather    • No Known Problems Paternal Grandmother    • No Known Problems Paternal Grandfather    • No Known Problems Brother    • No Known Problems Son    • No Known Problems Son    • No Known Problems Son      Social History     Socioeconomic History   • Marital status: /Civil Union     Spouse name: None   • Number of children: None   • Years of education: 12   • Highest education level: None   Occupational History   • None   Tobacco Use   • Smoking status: Every Day     Packs/day: 0 25     Years: 20 00     Total pack years: 5 00     Types: Cigarettes   • Smokeless tobacco: Never   • Tobacco comments:     6 CIGARRETTES A DAY   Vaping Use   • Vaping Use: Former   • Substances: Nicotine, Flavoring   Substance and Sexual Activity   • Alcohol use: Not Currently     Comment: 2020 Quit   • Drug use: No   • Sexual activity: None   Other Topics Concern   • None   Social History Narrative    · Most recent tobacco use screenin2019      · Do you currently or have you served in the [employer]:   No      · Were you activated, into active duty, as a member of the [employer] or as a Reservist:   No     · Exercise level:   Occasional      · Diet:   Cardiac  as much as possible     · General "stress level:   High      · Caffeine intake:   Heavy      · Seat belts used routinely:   Yes      · Sunscreen used routinely:   Yes      · Smoke alarm in home: Yes     · Salt Intake:   none      · Has the Patient had a mammogram to screen for breast cancer within 24 months:   No          Social Determinants of Health     Financial Resource Strain: Not on file   Food Insecurity: Not on file   Transportation Needs: Not on file   Physical Activity: Not on file   Stress: Not on file   Social Connections: Not on file   Intimate Partner Violence: Not on file   Housing Stability: Not on file     Current Outpatient Medications on File Prior to Visit   Medication Sig   • aspirin (ECOTRIN) 325 mg EC tablet Take 325 mg by mouth daily   • [DISCONTINUED] amLODIPine (NORVASC) 5 mg tablet TAKE 1 TABLET (5 MG TOTAL) BY MOUTH DAILY     • [DISCONTINUED] atorvastatin (LIPITOR) 40 mg tablet TAKE 1 TABLET BY MOUTH EVERY DAY   • [DISCONTINUED] buPROPion (WELLBUTRIN XL) 150 mg 24 hr tablet Take 1 tablet (150 mg total) by mouth every morning   • [DISCONTINUED] hydrOXYzine HCL (ATARAX) 25 mg tablet TAKE 1 TABLET BY MOUTH EVERY 6 HOURS AS NEEDED FOR ITCHING    • [DISCONTINUED] lisinopril (ZESTRIL) 20 mg tablet TAKE 1 TABLET BY MOUTH EVERY DAY   • [DISCONTINUED] venlafaxine (EFFEXOR-XR) 150 mg 24 hr capsule TAKE 1 CAPSULE BY MOUTH EVERY DAY     Allergies   Allergen Reactions   • Bactrim [Sulfamethoxazole-Trimethoprim] Hives     Immunization History   Administered Date(s) Administered   • COVID-19 MODERNA VACC 0 5 ML IM 04/05/2021, 05/06/2021   • INFLUENZA 02/08/2018, 09/28/2018   • Influenza, recombinant, quadrivalent,injectable, preservative free 12/04/2020   • Pneumococcal Polysaccharide PPV23 12/03/2018   • Tdap 11/22/2018       Objective     /80 (BP Location: Left arm, Patient Position: Sitting, Cuff Size: Large)   Pulse 74   Temp (!) 97 2 °F (36 2 °C) (Skin)   Ht 4' 11\" (1 499 m)   Wt 80 7 kg (178 lb)   SpO2 99%   BMI 35 95 " kg/m²     Physical Exam  Rony Dorado MD

## 2023-07-26 ENCOUNTER — OFFICE VISIT (OUTPATIENT)
Dept: FAMILY MEDICINE CLINIC | Facility: CLINIC | Age: 58
End: 2023-07-26
Payer: COMMERCIAL

## 2023-07-26 VITALS
DIASTOLIC BLOOD PRESSURE: 74 MMHG | WEIGHT: 176 LBS | BODY MASS INDEX: 35.48 KG/M2 | HEIGHT: 59 IN | TEMPERATURE: 97.9 F | HEART RATE: 76 BPM | OXYGEN SATURATION: 98 % | SYSTOLIC BLOOD PRESSURE: 118 MMHG | RESPIRATION RATE: 16 BRPM

## 2023-07-26 DIAGNOSIS — J06.9 URI WITH COUGH AND CONGESTION: Primary | ICD-10-CM

## 2023-07-26 PROCEDURE — 99214 OFFICE O/P EST MOD 30 MIN: CPT | Performed by: NURSE PRACTITIONER

## 2023-07-26 RX ORDER — BROMPHENIRAMINE MALEATE, PSEUDOEPHEDRINE HYDROCHLORIDE, AND DEXTROMETHORPHAN HYDROBROMIDE 2; 30; 10 MG/5ML; MG/5ML; MG/5ML
5 SYRUP ORAL 4 TIMES DAILY PRN
Qty: 120 ML | Refills: 0 | Status: SHIPPED | OUTPATIENT
Start: 2023-07-26

## 2023-07-26 RX ORDER — AZITHROMYCIN 250 MG/1
TABLET, FILM COATED ORAL
Qty: 6 TABLET | Refills: 0 | Status: SHIPPED | OUTPATIENT
Start: 2023-07-26 | End: 2023-07-30

## 2023-07-26 NOTE — PROGRESS NOTES
Name: Nellie Dewitt      : 1965      MRN: [id number]  Encounter Provider: ANTHONY Mercado  Encounter Date: 2023   Encounter department: [facility]    Assessment & Plan     1. URI with cough and congestion  -     brompheniramine-pseudoephedrine-DM 30-2-10 MG/5ML syrup; Take 5 mL by mouth 4 (four) times a day as needed for cough or allergies  -     azithromycin (Zithromax) 250 mg tablet; Take 2 tablets (500 mg total) by mouth daily for 1 day, THEN 1 tablet (250 mg total) daily for 4 days. Physical assessment is unremarkable. Patient will be treated empirically with azithromycin to be taken as directed due to her long smoking history. Also Bromfed will be taken on an as-needed basis up to 4 times per day. Patient's blood pressure is very well controlled she did not have a negative impact on that. Otherwise all questions were answered return to office or call if fails to improve. Dosing all possible side effects of the prescribed medications or medications that had been prescribed in the past were reviewed and all questions were answered. Patient verbalized agreement and understanding of the plan of care as outlined during the office visit today return to office as indicated or sooner if a problem arises. Subjective        Patient is here with a complaint of upper respiratory tract discomfort has had a cough runny nose and some nasal congestion. Denies any need nausea vomiting diarrhea chest pains or shortness of breath. All over-the-counter medication attempted arm were unsuccessful. Review of Systems   Constitutional: Negative for chills. HENT: Positive for congestion, rhinorrhea and sore throat. Respiratory: Positive for cough.         Current Outpatient Medications on File Prior to Visit   Medication Sig   • amLODIPine (NORVASC) 5 mg tablet Take 1 tablet (5 mg total) by mouth daily   • aspirin (ECOTRIN) 325 mg EC tablet Take 325 mg by mouth daily • atorvastatin (LIPITOR) 40 mg tablet Take 1 tablet (40 mg total) by mouth daily   • buPROPion (WELLBUTRIN XL) 150 mg 24 hr tablet Take 1 tablet (150 mg total) by mouth every morning   • hydrOXYzine HCL (ATARAX) 25 mg tablet Take 1 tablet (25 mg total) by mouth every 6 (six) hours as needed for itching   • lisinopril (ZESTRIL) 20 mg tablet Take 1 tablet (20 mg total) by mouth daily   • venlafaxine (EFFEXOR-XR) 150 mg 24 hr capsule Take 1 capsule (150 mg total) by mouth daily       Objective     /74 (BP Location: Left arm, Patient Position: Sitting, Cuff Size: Standard)   Pulse 76   Temp 97.9 °F (36.6 °C) (Temporal)   Resp 16   Ht 4' 11" (1.499 m)   Wt 79.8 kg (176 lb)   SpO2 98%   BMI 35.55 kg/m²     Physical Exam  Constitutional:       General: She is not in acute distress. Appearance: She is not diaphoretic. HENT:      Head: Atraumatic. Nose: Mucosal edema, congestion and rhinorrhea present. Right Sinus: Frontal sinus tenderness present. Left Sinus: Frontal sinus tenderness present. Cardiovascular:      Rate and Rhythm: Normal rate and regular rhythm. Heart sounds: Normal heart sounds. Pulmonary:      Effort: Pulmonary effort is normal.      Breath sounds: Normal breath sounds. Musculoskeletal:         General: Normal range of motion. Cervical back: Normal range of motion.        ANTHONY Avila

## 2023-09-16 DIAGNOSIS — F41.9 ANXIETY AND DEPRESSION: ICD-10-CM

## 2023-09-16 DIAGNOSIS — F32.A ANXIETY AND DEPRESSION: ICD-10-CM

## 2023-09-18 RX ORDER — HYDROXYZINE HYDROCHLORIDE 25 MG/1
25 TABLET, FILM COATED ORAL EVERY 6 HOURS PRN
Qty: 90 TABLET | Refills: 1 | Status: SHIPPED | OUTPATIENT
Start: 2023-09-18

## 2023-10-19 ENCOUNTER — APPOINTMENT (OUTPATIENT)
Dept: LAB | Facility: HOSPITAL | Age: 58
End: 2023-10-19
Payer: MEDICARE

## 2023-12-11 DIAGNOSIS — F32.A DEPRESSION, UNSPECIFIED DEPRESSION TYPE: ICD-10-CM

## 2023-12-11 RX ORDER — BUPROPION HYDROCHLORIDE 150 MG/1
150 TABLET ORAL EVERY MORNING
Qty: 90 TABLET | Refills: 1 | Status: SHIPPED | OUTPATIENT
Start: 2023-12-11

## 2023-12-11 RX ORDER — VENLAFAXINE HYDROCHLORIDE 150 MG/1
150 CAPSULE, EXTENDED RELEASE ORAL DAILY
Qty: 90 CAPSULE | Refills: 1 | Status: SHIPPED | OUTPATIENT
Start: 2023-12-11

## 2023-12-28 DIAGNOSIS — I10 ESSENTIAL (PRIMARY) HYPERTENSION: ICD-10-CM

## 2023-12-28 RX ORDER — AMLODIPINE BESYLATE 5 MG/1
5 TABLET ORAL DAILY
Qty: 90 TABLET | Refills: 1 | Status: SHIPPED | OUTPATIENT
Start: 2023-12-28

## (undated) DEVICE — BASIC DOUBLE BASIN 2-LF: Brand: MEDLINE INDUSTRIES, INC.

## (undated) DEVICE — DRAPE C-ARM X-RAY

## (undated) DEVICE — ALUMI-HAND POSITIONER XLG

## (undated) DEVICE — ACE WRAP 4 IN UNSTERILE

## (undated) DEVICE — ASTOUND STANDARD SURGICAL GOWN, XL: Brand: CONVERTORS

## (undated) DEVICE — GLOVE SRG BIOGEL 7.5

## (undated) DEVICE — SUT CHROMIC 5-0 PS-3 18 IN 1636G

## (undated) DEVICE — CULTURE TUBE ANAEROBIC

## (undated) DEVICE — STRL PENROSE DRAIN 18" X 1/4": Brand: CARDINAL HEALTH

## (undated) DEVICE — TIBURON EXTREMITY SHEET: Brand: CONVERTORS

## (undated) DEVICE — SPONGE GAUZE 4 X 8 12 PLY STRL LF

## (undated) DEVICE — ACE WRAP 3 IN UNSTERILE

## (undated) DEVICE — PRECISION THIN (5.5 X 0.38 X 11.5MM)

## (undated) DEVICE — CYSTO TUBING SINGLE IRRIGATION

## (undated) DEVICE — STRETCH BANDAGE: Brand: CURITY

## (undated) DEVICE — CURITY NON-ADHERENT STRIPS: Brand: CURITY

## (undated) DEVICE — INTENDED FOR TISSUE SEPARATION, AND OTHER PROCEDURES THAT REQUIRE A SHARP SURGICAL BLADE TO PUNCTURE OR CUT.: Brand: BARD-PARKER SAFETY BLADES SIZE 15, STERILE

## (undated) DEVICE — CULTURE TUBE AEROBIC

## (undated) DEVICE — CUFF TOURNIQUET 18 X 4 IN QUICK CONNECT DISP 1 BLADDER

## (undated) DEVICE — GLOVE INDICATOR PI UNDERGLOVE SZ 8 BLUE

## (undated) DEVICE — SUT PDS II 4-0 PS-2 18 IN Z496G

## (undated) DEVICE — PACK GENERAL LF